# Patient Record
Sex: FEMALE | Race: BLACK OR AFRICAN AMERICAN | Employment: OTHER | URBAN - METROPOLITAN AREA
[De-identification: names, ages, dates, MRNs, and addresses within clinical notes are randomized per-mention and may not be internally consistent; named-entity substitution may affect disease eponyms.]

---

## 2017-02-22 ENCOUNTER — OFFICE VISIT (OUTPATIENT)
Dept: INTERNAL MEDICINE CLINIC | Facility: HOSPITAL | Age: 52
End: 2017-02-22
Attending: EMERGENCY MEDICINE

## 2017-02-22 ENCOUNTER — HOSPITAL ENCOUNTER (OUTPATIENT)
Dept: GENERAL RADIOLOGY | Facility: HOSPITAL | Age: 52
Discharge: HOME | End: 2017-02-22
Attending: EMERGENCY MEDICINE

## 2017-02-22 DIAGNOSIS — Z00.00 WELLNESS EXAMINATION: ICD-10-CM

## 2017-02-22 DIAGNOSIS — Z00.00 WELLNESS EXAMINATION: Primary | ICD-10-CM

## 2017-02-22 LAB — RUBV IGG SER-ACNC: 34.3 IU/ML

## 2017-02-22 PROCEDURE — 86762 RUBELLA ANTIBODY: CPT

## 2017-02-22 PROCEDURE — 86787 VARICELLA-ZOSTER ANTIBODY: CPT

## 2017-02-22 PROCEDURE — 86765 RUBEOLA ANTIBODY: CPT

## 2017-02-22 PROCEDURE — 86735 MUMPS ANTIBODY: CPT

## 2017-02-22 PROCEDURE — 71020 XR CHEST PA + LAT CHEST (CPT=71020): CPT

## 2017-02-24 LAB
MEV IGG SER-ACNC: 300 AU/ML (ref 30–?)
MUV IGG SER IA-ACNC: 214 AU/ML (ref 11–?)
VZV IGG SER IA-ACNC: 1212 (ref 165–?)

## 2017-04-10 ENCOUNTER — TELEPHONE (OUTPATIENT)
Dept: OPHTHALMOLOGY | Facility: CLINIC | Age: 52
End: 2017-04-10

## 2017-04-10 NOTE — TELEPHONE ENCOUNTER
Pt states that she poked her right eye with a cheese bag on Friday. Pt was seen at [place] [address] ER Saturday and was told no corneal abrasion, just a broken blood vessel OD. Pt is concerned because now she has been getting blurred vision OD.  Apt scheduled on We

## 2017-04-10 NOTE — TELEPHONE ENCOUNTER
LM for pt to call back. PLEASE GET PATIENTS INSURANCE INFORMATION AND PCP INFO WHEN SHE CALLS BACK. Thank you.

## 2017-04-10 NOTE — TELEPHONE ENCOUNTER
pt called. Was seen at ED yesterday. She poked her eye. Blood shot red. Looks like a blood vessel is busted. Please advise.

## 2017-04-12 ENCOUNTER — OFFICE VISIT (OUTPATIENT)
Dept: OPHTHALMOLOGY | Facility: CLINIC | Age: 52
End: 2017-04-12

## 2017-04-12 DIAGNOSIS — H11.31 TRAUMATIC SUBCONJUNCTIVAL HEMORRHAGE OF RIGHT EYE: Primary | ICD-10-CM

## 2017-04-12 PROCEDURE — 99203 OFFICE O/P NEW LOW 30 MIN: CPT | Performed by: OPHTHALMOLOGY

## 2017-04-12 PROCEDURE — 99212 OFFICE O/P EST SF 10 MIN: CPT | Performed by: OPHTHALMOLOGY

## 2017-04-12 NOTE — ASSESSMENT & PLAN NOTE
Discussed diagnosis with patient. No treatment is needed at this time. Patient was reassured that there is no scratch, infection, foreign body or inflammation in the eye. Told  patient that this may take up to  1-2 weeks to resolve.    Patient instructed

## 2017-04-12 NOTE — PATIENT INSTRUCTIONS
Traumatic subconjunctival hemorrhage of right eye  Discussed diagnosis with patient. No treatment is needed at this time. Patient was reassured that there is no scratch, infection, foreign body or inflammation in the eye.   Told  patient that this may shaniqua a hemorrhage. It usually happens once and then goes away. But some health conditions may cause repeat hemorrhages. You may feel like you have something in your eye, but this is not common. The hemorrhage shouldn’t affect your eyesight or cause any pain.  If This information is not intended as a substitute for professional medical care. Always follow your healthcare professional's instructions.         Subconjunctival Hemorrhage    A subconjunctival hemorrhage is a result of a broken blood vessel in the white p

## 2017-04-12 NOTE — PROGRESS NOTES
Robert Marshall is a 49year old female. HPI:     HPI     NP. Patient states that she poked her right eye with a empty plastic cheese bag on 4/7/17.  Patient was seen at [place] ER on 4/8/17 and was told no corneal abrasion, just a broken blood vessel in instructed that they can use over the counter artificial tears as needed for ocular comfort while this is resolving. Subconjunctival hemmorhage information  given. No orders of the defined types were placed in this encounter.        Meds This Visi

## 2017-09-08 ENCOUNTER — OFFICE VISIT (OUTPATIENT)
Dept: FAMILY MEDICINE CLINIC | Facility: CLINIC | Age: 52
End: 2017-09-08

## 2017-09-08 VITALS
HEART RATE: 67 BPM | OXYGEN SATURATION: 98 % | TEMPERATURE: 98 F | WEIGHT: 215 LBS | BODY MASS INDEX: 35.82 KG/M2 | SYSTOLIC BLOOD PRESSURE: 112 MMHG | HEIGHT: 65 IN | DIASTOLIC BLOOD PRESSURE: 82 MMHG | RESPIRATION RATE: 16 BRPM

## 2017-09-08 DIAGNOSIS — Z00.00 ROUTINE ADULT HEALTH MAINTENANCE: Primary | ICD-10-CM

## 2017-09-08 DIAGNOSIS — I10 ESSENTIAL HYPERTENSION: ICD-10-CM

## 2017-09-08 DIAGNOSIS — E66.9 OBESITY (BMI 35.0-39.9 WITHOUT COMORBIDITY): ICD-10-CM

## 2017-09-08 PROCEDURE — 87491 CHLMYD TRACH DNA AMP PROBE: CPT | Performed by: FAMILY MEDICINE

## 2017-09-08 PROCEDURE — 99386 PREV VISIT NEW AGE 40-64: CPT | Performed by: FAMILY MEDICINE

## 2017-09-08 PROCEDURE — 87624 HPV HI-RISK TYP POOLED RSLT: CPT | Performed by: FAMILY MEDICINE

## 2017-09-08 PROCEDURE — 88175 CYTOPATH C/V AUTO FLUID REDO: CPT | Performed by: FAMILY MEDICINE

## 2017-09-08 PROCEDURE — 87591 N.GONORRHOEAE DNA AMP PROB: CPT | Performed by: FAMILY MEDICINE

## 2017-09-08 RX ORDER — ENALAPRIL MALEATE AND HYDROCHLOROTHIAZIDE 10; 25 MG/1; MG/1
TABLET ORAL
COMMUNITY
Start: 2017-07-21 | End: 2019-04-29

## 2017-09-08 RX ORDER — POTASSIUM CHLORIDE 750 MG/1
TABLET, FILM COATED, EXTENDED RELEASE ORAL
COMMUNITY
Start: 2017-07-21 | End: 2020-10-13

## 2017-09-08 NOTE — PROGRESS NOTES
Yunier Cruz is a 49year old female who presents for a complete physical exam.     HPI:   Last PAP: 2 years ag  Last Mammogram: over a year ago  Contraception: condoms, sometimes  History of STD's: no  History of intimate partner violence: no  Family hx immunization history on file for this patient.     MEDICAL HISTORY:     Past Medical History:   Diagnosis Date   • Essential hypertension        SOCIAL HISTORY:     Social History  Social History   Marital status:   Spouse name: N/A    Years of educ normal, cervix normal, right adnexa normal and left adnexa normal. No vaginal discharge found. Musculoskeletal: Normal range of motion. Lymphadenopathy:     She has no cervical adenopathy.    Neurological: She is alert and oriented to person, place, and self-exam  · Breast cancer screening/ mammograms and clinical breast exams  · Cervical cancer screening/ pap smears  · Healthy diet including adequate intake of vegetables and fruits, appropriate portion sizes, minimizing highly concentrated carbohydrate f

## 2017-09-10 LAB
C TRACH DNA SPEC QL NAA+PROBE: NEGATIVE
HPV I/H RISK 1 DNA SPEC QL NAA+PROBE: NEGATIVE
N GONORRHOEA DNA SPEC QL NAA+PROBE: NEGATIVE

## 2017-10-06 ENCOUNTER — LAB ENCOUNTER (OUTPATIENT)
Dept: LAB | Facility: REFERENCE LAB | Age: 52
End: 2017-10-06
Attending: FAMILY MEDICINE
Payer: COMMERCIAL

## 2017-10-06 ENCOUNTER — HOSPITAL ENCOUNTER (OUTPATIENT)
Dept: MAMMOGRAPHY | Age: 52
Discharge: HOME | End: 2017-10-06
Attending: FAMILY MEDICINE
Payer: COMMERCIAL

## 2017-10-06 DIAGNOSIS — I10 ESSENTIAL HYPERTENSION: ICD-10-CM

## 2017-10-06 DIAGNOSIS — E66.9 OBESITY (BMI 35.0-39.9 WITHOUT COMORBIDITY): ICD-10-CM

## 2017-10-06 DIAGNOSIS — Z00.00 ROUTINE ADULT HEALTH MAINTENANCE: ICD-10-CM

## 2017-10-06 PROCEDURE — 82570 ASSAY OF URINE CREATININE: CPT | Performed by: FAMILY MEDICINE

## 2017-10-06 PROCEDURE — 86696 HERPES SIMPLEX TYPE 2 TEST: CPT | Performed by: FAMILY MEDICINE

## 2017-10-06 PROCEDURE — 82306 VITAMIN D 25 HYDROXY: CPT | Performed by: FAMILY MEDICINE

## 2017-10-06 PROCEDURE — 77063 BREAST TOMOSYNTHESIS BI: CPT | Performed by: FAMILY MEDICINE

## 2017-10-06 PROCEDURE — 87389 HIV-1 AG W/HIV-1&-2 AB AG IA: CPT | Performed by: FAMILY MEDICINE

## 2017-10-06 PROCEDURE — 82043 UR ALBUMIN QUANTITATIVE: CPT | Performed by: FAMILY MEDICINE

## 2017-10-06 PROCEDURE — 36415 COLL VENOUS BLD VENIPUNCTURE: CPT | Performed by: FAMILY MEDICINE

## 2017-10-06 PROCEDURE — 77067 SCR MAMMO BI INCL CAD: CPT | Performed by: FAMILY MEDICINE

## 2017-10-06 PROCEDURE — 83036 HEMOGLOBIN GLYCOSYLATED A1C: CPT | Performed by: FAMILY MEDICINE

## 2017-10-06 PROCEDURE — 80061 LIPID PANEL: CPT | Performed by: FAMILY MEDICINE

## 2017-10-06 PROCEDURE — 84443 ASSAY THYROID STIM HORMONE: CPT | Performed by: FAMILY MEDICINE

## 2017-10-06 PROCEDURE — 86695 HERPES SIMPLEX TYPE 1 TEST: CPT | Performed by: FAMILY MEDICINE

## 2017-10-06 PROCEDURE — 86780 TREPONEMA PALLIDUM: CPT | Performed by: FAMILY MEDICINE

## 2017-11-02 ENCOUNTER — HOSPITAL ENCOUNTER (OUTPATIENT)
Dept: MAMMOGRAPHY | Facility: HOSPITAL | Age: 52
Discharge: HOME | End: 2017-11-02
Attending: FAMILY MEDICINE
Payer: COMMERCIAL

## 2017-11-02 DIAGNOSIS — R92.8 ABNORMAL MAMMOGRAM: ICD-10-CM

## 2017-11-02 PROCEDURE — 77065 DX MAMMO INCL CAD UNI: CPT | Performed by: FAMILY MEDICINE

## 2017-12-18 ENCOUNTER — TELEPHONE (OUTPATIENT)
Dept: OBGYN CLINIC | Facility: CLINIC | Age: 52
End: 2017-12-18

## 2017-12-18 NOTE — TELEPHONE ENCOUNTER
Patient calling to see if the paperwork, health provider screening is finished and if this can be faxed to the patient. Fax [phone].

## 2017-12-18 NOTE — TELEPHONE ENCOUNTER
Called patient, advised this form has been completed and faxed to fax number listed on form.  Patient wishes to have the form faxed to her so she can have a copy, advised patient that I have sent form to scanning and I do not have access to this as of now,

## 2017-12-28 NOTE — TELEPHONE ENCOUNTER
Work health provider screening form was faxed to [phone] per patient's request. Received confirmation fax sheet.

## 2018-04-26 ENCOUNTER — HOSPITAL ENCOUNTER (EMERGENCY)
Facility: HOSPITAL | Age: 53
Discharge: HOME | End: 2018-04-26
Payer: COMMERCIAL

## 2018-04-26 ENCOUNTER — APPOINTMENT (OUTPATIENT)
Dept: CT IMAGING | Facility: HOSPITAL | Age: 53
End: 2018-04-26
Payer: COMMERCIAL

## 2018-04-26 ENCOUNTER — HOSPITAL ENCOUNTER (OUTPATIENT)
Dept: CT IMAGING | Facility: HOSPITAL | Age: 53
Discharge: HOME | End: 2018-04-26
Attending: FAMILY MEDICINE
Payer: COMMERCIAL

## 2018-04-26 ENCOUNTER — OFFICE VISIT (OUTPATIENT)
Dept: FAMILY MEDICINE CLINIC | Facility: CLINIC | Age: 53
End: 2018-04-26

## 2018-04-26 VITALS
HEIGHT: 65 IN | OXYGEN SATURATION: 98 % | RESPIRATION RATE: 17 BRPM | DIASTOLIC BLOOD PRESSURE: 82 MMHG | WEIGHT: 216 LBS | SYSTOLIC BLOOD PRESSURE: 132 MMHG | BODY MASS INDEX: 35.99 KG/M2 | HEART RATE: 69 BPM

## 2018-04-26 VITALS
SYSTOLIC BLOOD PRESSURE: 144 MMHG | TEMPERATURE: 98 F | HEART RATE: 71 BPM | DIASTOLIC BLOOD PRESSURE: 90 MMHG | RESPIRATION RATE: 19 BRPM | OXYGEN SATURATION: 99 %

## 2018-04-26 DIAGNOSIS — K59.00 CONSTIPATION, UNSPECIFIED CONSTIPATION TYPE: ICD-10-CM

## 2018-04-26 DIAGNOSIS — R10.33 UMBILICAL PAIN: Primary | ICD-10-CM

## 2018-04-26 DIAGNOSIS — L91.0 KELOID: ICD-10-CM

## 2018-04-26 DIAGNOSIS — Q64.4 URACHAL CYST: Primary | ICD-10-CM

## 2018-04-26 DIAGNOSIS — M54.12 CERVICAL RADICULOPATHY: ICD-10-CM

## 2018-04-26 DIAGNOSIS — R10.33 UMBILICAL PAIN: ICD-10-CM

## 2018-04-26 PROCEDURE — 96361 HYDRATE IV INFUSION ADD-ON: CPT

## 2018-04-26 PROCEDURE — 80048 BASIC METABOLIC PNL TOTAL CA: CPT | Performed by: EMERGENCY MEDICINE

## 2018-04-26 PROCEDURE — 99214 OFFICE O/P EST MOD 30 MIN: CPT | Performed by: FAMILY MEDICINE

## 2018-04-26 PROCEDURE — 82565 ASSAY OF CREATININE: CPT

## 2018-04-26 PROCEDURE — 99284 EMERGENCY DEPT VISIT MOD MDM: CPT

## 2018-04-26 PROCEDURE — 85025 COMPLETE CBC W/AUTO DIFF WBC: CPT | Performed by: EMERGENCY MEDICINE

## 2018-04-26 PROCEDURE — 74177 CT ABD & PELVIS W/CONTRAST: CPT

## 2018-04-26 PROCEDURE — 80076 HEPATIC FUNCTION PANEL: CPT | Performed by: EMERGENCY MEDICINE

## 2018-04-26 PROCEDURE — 96374 THER/PROPH/DIAG INJ IV PUSH: CPT

## 2018-04-26 PROCEDURE — 96375 TX/PRO/DX INJ NEW DRUG ADDON: CPT

## 2018-04-26 PROCEDURE — 83690 ASSAY OF LIPASE: CPT | Performed by: EMERGENCY MEDICINE

## 2018-04-26 RX ORDER — DOCUSATE SODIUM 100 MG/1
100 CAPSULE, LIQUID FILLED ORAL 2 TIMES DAILY
Qty: 60 CAPSULE | Refills: 0 | Status: SHIPPED | OUTPATIENT
Start: 2018-04-26 | End: 2018-04-26

## 2018-04-26 RX ORDER — ONDANSETRON 2 MG/ML
4 INJECTION INTRAMUSCULAR; INTRAVENOUS ONCE
Status: COMPLETED | OUTPATIENT
Start: 2018-04-26 | End: 2018-04-26

## 2018-04-26 RX ORDER — HYDROCODONE BITARTRATE AND ACETAMINOPHEN 5; 325 MG/1; MG/1
1-2 TABLET ORAL EVERY 4 HOURS PRN
Qty: 15 TABLET | Refills: 0 | Status: SHIPPED | OUTPATIENT
Start: 2018-04-26 | End: 2018-05-03

## 2018-04-26 RX ORDER — CEPHALEXIN 500 MG/1
500 CAPSULE ORAL 4 TIMES DAILY
Qty: 28 CAPSULE | Refills: 0 | Status: SHIPPED | OUTPATIENT
Start: 2018-04-26 | End: 2018-05-03

## 2018-04-26 RX ORDER — DOCUSATE SODIUM 100 MG/1
100 CAPSULE, LIQUID FILLED ORAL 2 TIMES DAILY
Qty: 60 CAPSULE | Refills: 0 | Status: SHIPPED | OUTPATIENT
Start: 2018-04-26 | End: 2018-05-26

## 2018-04-26 RX ORDER — HYDROCODONE BITARTRATE AND ACETAMINOPHEN 5; 325 MG/1; MG/1
1-2 TABLET ORAL EVERY 4 HOURS PRN
Qty: 15 TABLET | Refills: 0 | Status: SHIPPED | OUTPATIENT
Start: 2018-04-26 | End: 2018-04-26

## 2018-04-26 RX ORDER — MORPHINE SULFATE 4 MG/ML
4 INJECTION, SOLUTION INTRAMUSCULAR; INTRAVENOUS ONCE
Status: COMPLETED | OUTPATIENT
Start: 2018-04-26 | End: 2018-04-26

## 2018-04-27 NOTE — ED PROVIDER NOTES
Patient Seen in: [facility] AND [facility] Emergency Department    History   Patient presents with:  Abdomen/Flank Pain (GI/)    Stated Complaint: abd pain     HPI    44-year-old female with history of hypertension here with complaints of periumbilical abdomin light-headedness and headaches. All other systems reviewed and are negative. Positive for stated complaint: abd pain   Other systems are as noted in HPI. Constitutional and vital signs reviewed.       All other systems reviewed and negative except a oxygenation.     PROCEDURES:  none    DIAGNOSTICS:   Labs:    Recent Results (from the past 24 hour(s))  -POCT CREATININE   Collection Time: 04/26/18  7:29 PM   Result Value Ref Range   ISTAT Creatinine 1.0 0.5 - 1.5 mg/dL   GFR, Non-African American >60 >= Abdomen+pelvis(contrast Only)(cpt=74177)    Result Date: 4/26/2018  CONCLUSION:  1. Hepatomegaly versus Riedel's lobe variant. No visualized focal hepatic lesions. 2. Negative gallbladder. No biliary ductal dilatation.  Unremarkable spleen is pancreas, adre with surgery  - discussed with Dr. Abdullahi Muniz - requesting followup with Dr. Cortes Silva and cover with antibiotics    The patient was informed of their elevated blood pressure reading in the Emergency Department.   They were informed of the dangers of undiagnosed List    START taking these medications    HYDROcodone-acetaminophen 5-325 MG Oral Tab  Take 1-2 tablets by mouth every 4 (four) hours as needed.   Qty: 15 tablet Refills: 0    docusate sodium 100 MG Oral Cap  Take 1 capsule (100 mg total) by mouth 2 (two) t

## 2018-04-27 NOTE — ED INITIAL ASSESSMENT (HPI)
Pt here with abd pain, was having outpt ct but is crying in pain. Did drink the contrast and is ready for ct. No nausea. No diarrhea.  No dark stools

## 2018-04-30 NOTE — PROGRESS NOTES
Abhishek Meléndez is a 47year old female.     Patient presents with:  Blood In Stool: started a couple weeks ago as if she had her period, blood in stool came back last week, no longer has blood in stool (coming and going)  Swelling: stomach/naval, had surger cephALEXin (KEFLEX) 500 MG Oral Cap Take 1 capsule (500 mg total) by mouth 4 (four) times daily.  Disp: 28 capsule Rfl: 0       SOCIAL HISTORY:     Social History  Social History   Marital status:   Spouse name: N/A    Years of education: N/A  Num Future    2. Keloid  - CT ABDOMEN (W+WO) (CPT=74170); Future    3. Cervical radiculopathy  - PHYSICAL THERAPY - INTERNAL    Pain at umbilicus significant and sent for stat CT of abdomen. R/o strangulated hernia vs abdominal abscess.  Will give further instr

## 2018-05-01 PROBLEM — R19.09 UMBILICAL MASS: Status: ACTIVE | Noted: 2018-05-01

## 2018-05-01 PROBLEM — L91.0 KELOID: Status: ACTIVE | Noted: 2018-05-01

## 2018-05-02 ENCOUNTER — PATIENT MESSAGE (OUTPATIENT)
Dept: FAMILY MEDICINE CLINIC | Facility: CLINIC | Age: 53
End: 2018-05-02

## 2018-05-02 DIAGNOSIS — K92.1 BLOOD IN STOOL: Primary | ICD-10-CM

## 2018-05-02 DIAGNOSIS — Z12.11 COLON CANCER SCREENING: ICD-10-CM

## 2018-05-02 NOTE — TELEPHONE ENCOUNTER
From: Zamzam Mejia  To: Wendy Gaspar DO  Sent: 5/2/2018 8:57 AM CDT  Subject: Non-Urgent Medical Question    Good morning Dr Konrad Pace, I am scheduled for outpatient surgery on Friday 5/4/2018. I will be off work for the week of 5/7/2018.  I would like to

## 2018-05-04 ENCOUNTER — LAB REQUISITION (OUTPATIENT)
Dept: LAB | Facility: HOSPITAL | Age: 53
End: 2018-05-04
Payer: COMMERCIAL

## 2018-05-04 DIAGNOSIS — Z01.89 ENCOUNTER FOR OTHER SPECIFIED SPECIAL EXAMINATIONS: ICD-10-CM

## 2018-05-04 PROCEDURE — 88300 SURGICAL PATH GROSS: CPT | Performed by: SURGERY

## 2018-05-04 PROCEDURE — 88305 TISSUE EXAM BY PATHOLOGIST: CPT | Performed by: SURGERY

## 2018-05-24 ENCOUNTER — TELEPHONE (OUTPATIENT)
Dept: GASTROENTEROLOGY | Facility: CLINIC | Age: 53
End: 2018-05-24

## 2018-05-24 ENCOUNTER — OFFICE VISIT (OUTPATIENT)
Dept: GASTROENTEROLOGY | Facility: CLINIC | Age: 53
End: 2018-05-24

## 2018-05-24 VITALS
HEART RATE: 68 BPM | SYSTOLIC BLOOD PRESSURE: 124 MMHG | DIASTOLIC BLOOD PRESSURE: 84 MMHG | HEIGHT: 65 IN | BODY MASS INDEX: 36.26 KG/M2 | WEIGHT: 217.63 LBS

## 2018-05-24 DIAGNOSIS — K62.5 RECTAL BLEEDING: ICD-10-CM

## 2018-05-24 DIAGNOSIS — Z12.11 ENCOUNTER FOR SCREENING COLONOSCOPY: Primary | ICD-10-CM

## 2018-05-24 PROCEDURE — 99212 OFFICE O/P EST SF 10 MIN: CPT | Performed by: PHYSICIAN ASSISTANT

## 2018-05-24 PROCEDURE — 99244 OFF/OP CNSLTJ NEW/EST MOD 40: CPT | Performed by: PHYSICIAN ASSISTANT

## 2018-05-24 RX ORDER — POLYETHYLENE GLYCOL 3350, SODIUM CHLORIDE, SODIUM BICARBONATE, POTASSIUM CHLORIDE 420; 11.2; 5.72; 1.48 G/4L; G/4L; G/4L; G/4L
POWDER, FOR SOLUTION ORAL
Qty: 1 BOTTLE | Refills: 0 | Status: SHIPPED | OUTPATIENT
Start: 2018-05-24 | End: 2018-12-03

## 2018-05-24 NOTE — TELEPHONE ENCOUNTER
Yes. That is fine.      Thank you    Elliot Blancas MD  [facility], [facility] - Gastroenterology  5/24/2018  11:24 AM

## 2018-05-24 NOTE — H&P
[address] [facility] [address] Gastroenterology                                                                                                  Clinic History and Physical     Pa  @ [employer]  - Lives with brother     History, Medications, Allergies, ROS:      Past Medical History:   Diagnosis Date   • Essential hypertension       Past Surgical History:  02/2014: COY NEEDLE LOCALIZATION W/ SPECIMEN 1 SITE LEFT      Com lb 9.6 oz (98.7 kg), last menstrual period 05/04/2018.     Gen: patient appears comfortable and in no acute discomfort  HEENT: conjunctiva pink, the sclera appears anicteric, OP clear, MMM  CV: regular rate and rhythm  Lung: moves air well; no labored breat advised    Colonoscopy consent: I have discussed the risks, benefits, and alternatives to colonoscopy with the patient [who demonstrated understanding], including but not limited to the risks of bleeding, infection, pain, death, as well as the risks of ane

## 2018-05-24 NOTE — TELEPHONE ENCOUNTER
GI RN's - pt is requesting a letter for work for the day of her colonoscopy on 6/13/18 with Dr. Cathleen Johnson. Please advise. Thank you.

## 2018-05-24 NOTE — TELEPHONE ENCOUNTER
Pt contacted and informed her letter has been sent via [employer] and she requested it be faxed also to [phone]. Letter faxed as well. Fax confirmation received 5/24/18 @ 3895.

## 2018-05-24 NOTE — TELEPHONE ENCOUNTER
Amanda - please place order for pt's Trilyte. Pharmacy is [facility] in [facility]. Thank you.

## 2018-05-24 NOTE — PATIENT INSTRUCTIONS
1. Schedule colonoscopy with MD provider @ [address] [place] [address] with MAC anesthesia   - Preferably before the patient leaves for the [facility] in early June     2.  bowel prep from pharmacy - I have prescribed Trilyte split dose preparation.     3. Continue all medicat

## 2018-05-24 NOTE — TELEPHONE ENCOUNTER
Scheduled for:  Colonoscopy - [address]  Provider Name:  Dr. Coley Honor  Date:  6/13/18  Location:  [facility]  Sedation:  MAC  Time:  (pt is aware that [facility] will call with arrival time)  Prep:  Trilyte, Prep instructions were given to pt in the office, pt verbalized u

## 2018-06-01 ENCOUNTER — TELEPHONE (OUTPATIENT)
Dept: GASTROENTEROLOGY | Facility: CLINIC | Age: 53
End: 2018-06-01

## 2018-06-07 NOTE — TELEPHONE ENCOUNTER
Rescheduled for:  Colonoscopy - [address] [facility]  Provider Name:  Dr. Dorota Haney  Date:     From-6/13/18  To-6/20/18  Location:  [facility]  Sedation:  MAC  Time:  1000 (pt is aware that [facility] will call with arrival time)  Prep:  Trilyte, new prep instructions sent via [employer]

## 2018-06-20 ENCOUNTER — LAB REQUISITION (OUTPATIENT)
Dept: LAB | Facility: HOSPITAL | Age: 53
End: 2018-06-20
Payer: COMMERCIAL

## 2018-06-20 DIAGNOSIS — Z01.89 ENCOUNTER FOR OTHER SPECIFIED SPECIAL EXAMINATIONS: ICD-10-CM

## 2018-06-20 PROCEDURE — 88305 TISSUE EXAM BY PATHOLOGIST: CPT | Performed by: INTERNAL MEDICINE

## 2018-06-22 ENCOUNTER — TELEPHONE (OUTPATIENT)
Dept: GASTROENTEROLOGY | Facility: CLINIC | Age: 53
End: 2018-06-22

## 2018-06-22 NOTE — TELEPHONE ENCOUNTER
Message   Received:  Today   Message Contents   Antony Loaiza MD  [facility] Clinical Staff             GI staff: please place recall for colonoscopy in 5 years

## 2018-07-25 ENCOUNTER — TELEPHONE (OUTPATIENT)
Dept: FAMILY MEDICINE CLINIC | Facility: CLINIC | Age: 53
End: 2018-07-25

## 2018-07-30 NOTE — TELEPHONE ENCOUNTER
Called pt back and left message  Informing pt of the below.  5000 units of vit d per dr Jimbo Calles

## 2018-07-31 ENCOUNTER — TELEPHONE (OUTPATIENT)
Dept: FAMILY MEDICINE CLINIC | Facility: CLINIC | Age: 53
End: 2018-07-31

## 2018-07-31 NOTE — TELEPHONE ENCOUNTER
Pt calling back re: Vit D. Pt would like to have a script sent in for Vit D to her mail order [facility] so she will automatically get refills sent to her and she will remember to take it. Per Dr. Wilver Madrigal, her ins co will not likely cover this d/t dosing.

## 2018-08-16 ENCOUNTER — OFFICE VISIT (OUTPATIENT)
Dept: FAMILY MEDICINE CLINIC | Facility: CLINIC | Age: 53
End: 2018-08-16
Payer: COMMERCIAL

## 2018-08-16 VITALS
WEIGHT: 220 LBS | DIASTOLIC BLOOD PRESSURE: 98 MMHG | BODY MASS INDEX: 37 KG/M2 | HEART RATE: 76 BPM | OXYGEN SATURATION: 99 % | SYSTOLIC BLOOD PRESSURE: 136 MMHG

## 2018-08-16 DIAGNOSIS — M54.2 NECK PAIN: ICD-10-CM

## 2018-08-16 DIAGNOSIS — G56.32 RADIAL TUNNEL SYNDROME OF LEFT UPPER EXTREMITY: Primary | ICD-10-CM

## 2018-08-16 DIAGNOSIS — M54.9 UPPER BACK PAIN: ICD-10-CM

## 2018-08-16 DIAGNOSIS — R03.0 PREHYPERTENSION: ICD-10-CM

## 2018-08-16 DIAGNOSIS — M62.838 MUSCLE SPASM: ICD-10-CM

## 2018-08-16 PROCEDURE — 99214 OFFICE O/P EST MOD 30 MIN: CPT | Performed by: FAMILY MEDICINE

## 2018-08-16 RX ORDER — BACLOFEN 10 MG/1
10 TABLET ORAL 3 TIMES DAILY PRN
Qty: 60 TABLET | Refills: 0 | Status: SHIPPED | OUTPATIENT
Start: 2018-08-16 | End: 2018-12-03

## 2018-08-16 RX ORDER — BACLOFEN 10 MG/1
10 TABLET ORAL 3 TIMES DAILY PRN
Qty: 60 TABLET | Refills: 0 | Status: SHIPPED | OUTPATIENT
Start: 2018-08-16 | End: 2018-08-16

## 2018-08-16 NOTE — PROGRESS NOTES
Marthenia Collet is a 47year old female. Patient presents with:  Pain: left shoulder      HPI:     Having more numbness and tingling into the Lt arm  Having numbness from the left elbow down into her fingers.   Has gotten worse since she first mentioned i Topics   Smoking status: Current Some Day Smoker     Types: Cigarettes    Smokeless tobacco: Never Used    Comment: 4 cigarettes a day    Alcohol use No    Comment: occasionally    Drug use: Yes     Other Topics Concern   None on file     Social History Na Given further recommendations as below    Orders Placed This Visit:  No orders of the defined types were placed in this encounter.       Patient Instructions   The products and items listed below (the “Products”)  and their claims have not been evaluate

## 2018-08-21 ENCOUNTER — TELEPHONE (OUTPATIENT)
Dept: FAMILY MEDICINE CLINIC | Facility: CLINIC | Age: 53
End: 2018-08-21

## 2018-09-04 ENCOUNTER — OFFICE VISIT (OUTPATIENT)
Dept: PHYSICAL THERAPY | Facility: HOSPITAL | Age: 53
End: 2018-09-04
Attending: FAMILY MEDICINE
Payer: COMMERCIAL

## 2018-09-04 DIAGNOSIS — M54.2 NECK PAIN: ICD-10-CM

## 2018-09-04 DIAGNOSIS — M54.9 UPPER BACK PAIN: ICD-10-CM

## 2018-09-04 PROCEDURE — 97110 THERAPEUTIC EXERCISES: CPT

## 2018-09-04 PROCEDURE — 97161 PT EVAL LOW COMPLEX 20 MIN: CPT

## 2018-09-04 NOTE — PROGRESS NOTES
CERVICAL SPINE EVALUATION:   Referring Physician: Glo Darling DO    Date of Onset: April 2018  Date of Service: 9/4/2018   Diagnosis: Upper back pain (M54.9)  Neck pain (M54.2)   SUBJECTIVE:   PATIENT SUMMARY:  So Ruiz is a 47year old y/o R anterior/posterior cervical  Muscles,  Weakness in the middle and lower trapezius muscles, decreased cervical AROM, and elevated first rib on the L.   Pt would benefit from skilled Physical Therapy to reduce symptoms and to restore ROM, flexibility, and str in sitting, ergonomic work station set up.    Patient was instructed in and issued HEP for: Repeated Cerv retraction 10x to be done 3x/day;    Charges: PT Eval, x1;  TE x1;      Total Timed Treatment: 15 min     Total Treatment Time: 45 min       PLAN OF CA under my care.     X______________________________________________ Date________________  Certification From: 0/9/5026      To: 12/3/2018

## 2018-09-06 ENCOUNTER — APPOINTMENT (OUTPATIENT)
Dept: PHYSICAL THERAPY | Facility: HOSPITAL | Age: 53
End: 2018-09-06
Attending: FAMILY MEDICINE
Payer: COMMERCIAL

## 2018-09-10 ENCOUNTER — APPOINTMENT (OUTPATIENT)
Dept: PHYSICAL THERAPY | Facility: HOSPITAL | Age: 53
End: 2018-09-10
Attending: FAMILY MEDICINE
Payer: COMMERCIAL

## 2018-09-13 ENCOUNTER — APPOINTMENT (OUTPATIENT)
Dept: PHYSICAL THERAPY | Facility: HOSPITAL | Age: 53
End: 2018-09-13
Attending: FAMILY MEDICINE
Payer: COMMERCIAL

## 2018-09-17 ENCOUNTER — APPOINTMENT (OUTPATIENT)
Dept: PHYSICAL THERAPY | Facility: HOSPITAL | Age: 53
End: 2018-09-17
Attending: FAMILY MEDICINE
Payer: COMMERCIAL

## 2018-09-18 ENCOUNTER — TELEPHONE (OUTPATIENT)
Dept: OCCUPATIONAL MEDICINE | Facility: HOSPITAL | Age: 53
End: 2018-09-18

## 2018-09-19 ENCOUNTER — APPOINTMENT (OUTPATIENT)
Dept: PHYSICAL THERAPY | Facility: HOSPITAL | Age: 53
End: 2018-09-19
Attending: FAMILY MEDICINE
Payer: COMMERCIAL

## 2018-09-24 ENCOUNTER — APPOINTMENT (OUTPATIENT)
Dept: PHYSICAL THERAPY | Facility: HOSPITAL | Age: 53
End: 2018-09-24
Attending: FAMILY MEDICINE
Payer: COMMERCIAL

## 2018-09-26 ENCOUNTER — APPOINTMENT (OUTPATIENT)
Dept: PHYSICAL THERAPY | Facility: HOSPITAL | Age: 53
End: 2018-09-26
Attending: FAMILY MEDICINE
Payer: COMMERCIAL

## 2018-09-27 ENCOUNTER — APPOINTMENT (OUTPATIENT)
Dept: OCCUPATIONAL MEDICINE | Facility: HOSPITAL | Age: 53
End: 2018-09-27
Attending: FAMILY MEDICINE
Payer: COMMERCIAL

## 2018-10-01 ENCOUNTER — APPOINTMENT (OUTPATIENT)
Dept: PHYSICAL THERAPY | Facility: HOSPITAL | Age: 53
End: 2018-10-01
Attending: FAMILY MEDICINE
Payer: COMMERCIAL

## 2018-10-02 ENCOUNTER — APPOINTMENT (OUTPATIENT)
Dept: OCCUPATIONAL MEDICINE | Facility: HOSPITAL | Age: 53
End: 2018-10-02
Attending: FAMILY MEDICINE
Payer: COMMERCIAL

## 2018-10-03 ENCOUNTER — APPOINTMENT (OUTPATIENT)
Dept: PHYSICAL THERAPY | Facility: HOSPITAL | Age: 53
End: 2018-10-03
Attending: FAMILY MEDICINE
Payer: COMMERCIAL

## 2018-10-04 ENCOUNTER — APPOINTMENT (OUTPATIENT)
Dept: OCCUPATIONAL MEDICINE | Facility: HOSPITAL | Age: 53
End: 2018-10-04
Attending: FAMILY MEDICINE
Payer: COMMERCIAL

## 2018-10-09 ENCOUNTER — APPOINTMENT (OUTPATIENT)
Dept: OCCUPATIONAL MEDICINE | Facility: HOSPITAL | Age: 53
End: 2018-10-09
Attending: FAMILY MEDICINE
Payer: COMMERCIAL

## 2018-10-11 PROBLEM — F33.0 MILD EPISODE OF RECURRENT MAJOR DEPRESSIVE DISORDER (HCC): Status: ACTIVE | Noted: 2018-10-11

## 2018-10-11 PROBLEM — E66.9 OBESITY (BMI 30-39.9): Status: ACTIVE | Noted: 2017-09-08

## 2018-10-11 NOTE — PROGRESS NOTES
Honey Keenan is a 47year old female. Patient presents with:   Follow - Up: weight, cycle,       HPI:     Pain went away using the baclofen  Feeling very emotional -  Emotions are up and down  Son is now incarcerated for the past 6 yrs  Sister is in nu Spouse name: Not on file      Number of children: Not on file      Years of education: Not on file      Highest education level: Not on file    Social Needs      Financial resource strain: Not on file      Food insecurity - worry: Not on file      Food 1. Mild episode of recurrent major depressive disorder ([facility] [address]  -  NAVIGATOR    2. Obesity (BMI 30-39.9)  - MEDICAL NUTRITIONAL THERAPY ([place]) - INTERNAL REFERRAL    3. Chronic fatigue    4.  Essential hypertension    HTN - stable, cpm  Referred to [facility] n – ½ tsp Coriander Seeds  – ½ tsp Fennel Seeds     • Boil 4 cups of water  • Add each seed to the hot water and let steep for 10 min  • Strain out the seeds and pour the liquid into a thermos  • Sip the warm liquid throughout the day  • Start fresh with a n Cheese  Milk  Cream  Whey isolate  MOLDY NUTS & SEEDS  Peanuts  Cashews  Pistachios  CONDIMENTS  Barbecue sauce  Horseradish  Ketchup  Mayonnaise  Soy sauce  White vinegar  REFINED/PROCESSED FATS & OILS  Canola oil  Fake ‘butter’ spreads  Margarine  Sweden

## 2018-10-11 NOTE — PATIENT INSTRUCTIONS
The products and items listed below (the “Products”)  and their claims have not been evaluated by the Food and Drug Administration. Dietary products are not intended to treat, prevent, mitigate or cure disease.  Ultimately, you must draw your own conclusion Cucumber  Eggplant  Garlic (raw)  Kale  Onions  [place]  Spinach  Tomatoes  Zucchini  LOW SUGAR FRUITS  Avocado  Lemon  Lime  Olives  NON-GLUTINOUS GRAINS  Buckwheat  Millet  Oat bran  Quinoa  Teff  HEALTHY PROTEINS  Anchovies  Chicken  Eggs  Herring  Sal DRINKS  Chicory coffee  Filtered water  Herbal teas  ALCOHOLIC DRINKS  Beer  Cider  Liquors  Spirits  Wine       Snack ideas  ? Flax or seed crackers ([employer] Crackers)  ? Jerky (bison, grass-fed beef or turkey—try [employer] or [employer] Jerky Chews  ?  Sal

## 2018-10-16 ENCOUNTER — APPOINTMENT (OUTPATIENT)
Dept: OCCUPATIONAL MEDICINE | Facility: HOSPITAL | Age: 53
End: 2018-10-16
Attending: FAMILY MEDICINE
Payer: COMMERCIAL

## 2018-10-18 ENCOUNTER — APPOINTMENT (OUTPATIENT)
Dept: OCCUPATIONAL MEDICINE | Facility: HOSPITAL | Age: 53
End: 2018-10-18
Attending: FAMILY MEDICINE
Payer: COMMERCIAL

## 2018-10-18 ENCOUNTER — TELEPHONE (OUTPATIENT)
Dept: FAMILY MEDICINE CLINIC | Facility: CLINIC | Age: 53
End: 2018-10-18

## 2018-10-18 DIAGNOSIS — Z12.39 SCREENING BREAST EXAMINATION: Primary | ICD-10-CM

## 2018-10-18 NOTE — TELEPHONE ENCOUNTER
Per Dr. Michelle Deutsch ok to order. Mammo Yannick preferred. Ordered and pt notified.     Pt has appmnt scheduled with Dr. Michelle Deutsch on 12/3/18

## 2018-10-19 ENCOUNTER — TELEPHONE (OUTPATIENT)
Dept: FAMILY MEDICINE CLINIC | Facility: CLINIC | Age: 53
End: 2018-10-19

## 2018-10-19 NOTE — TELEPHONE ENCOUNTER
Spoke to pt and gave her Dr. Seble Borden recommendations. Pt voiced understanding and she will let us know how she is feeling on Monday.

## 2018-10-19 NOTE — TELEPHONE ENCOUNTER
Patient requesting an call back in regard to having Migraines states thinks its from medication that was prescribed patient unsure of medication name

## 2018-10-23 ENCOUNTER — APPOINTMENT (OUTPATIENT)
Dept: OCCUPATIONAL MEDICINE | Facility: HOSPITAL | Age: 53
End: 2018-10-23
Attending: FAMILY MEDICINE
Payer: COMMERCIAL

## 2018-12-03 ENCOUNTER — OFFICE VISIT (OUTPATIENT)
Dept: FAMILY MEDICINE CLINIC | Facility: CLINIC | Age: 53
End: 2018-12-03
Payer: COMMERCIAL

## 2018-12-03 VITALS
HEART RATE: 84 BPM | OXYGEN SATURATION: 97 % | BODY MASS INDEX: 36.49 KG/M2 | HEIGHT: 65 IN | DIASTOLIC BLOOD PRESSURE: 72 MMHG | SYSTOLIC BLOOD PRESSURE: 120 MMHG | WEIGHT: 219 LBS

## 2018-12-03 DIAGNOSIS — R73.03 PREDIABETES: ICD-10-CM

## 2018-12-03 DIAGNOSIS — Z00.00 ROUTINE MEDICAL EXAM: Primary | ICD-10-CM

## 2018-12-03 DIAGNOSIS — I10 ESSENTIAL HYPERTENSION: ICD-10-CM

## 2018-12-03 DIAGNOSIS — E66.9 OBESITY (BMI 30-39.9): ICD-10-CM

## 2018-12-03 PROCEDURE — 99396 PREV VISIT EST AGE 40-64: CPT | Performed by: FAMILY MEDICINE

## 2018-12-03 RX ORDER — ESCITALOPRAM OXALATE 5 MG/1
5 TABLET ORAL DAILY
Qty: 90 TABLET | Refills: 0 | Status: SHIPPED | OUTPATIENT
Start: 2018-12-03 | End: 2019-04-23

## 2018-12-03 NOTE — PROGRESS NOTES
Clementina Cárdenas is a 47year old female. Patient presents with:  Physical: no pap no breast exam       HPI:     Started the lexapro, was getting a HA from 10mg and broke it in half and feeling good with that.  Mood is much better, no longer crying all the MEDICAL HISTORY:     Past Medical History:   Diagnosis Date   • Colon adenoma 06/20/2018    repeat colonoscopy 6/2023   • Essential hypertension        CURRENT MEDICATIONS:     Current Outpatient Medications:  escitalopram 5 MG Oral Tab Take 1 tablet 97%   Weight: 219 lb   Height: 65\"       Physical Exam   Constitutional: She is oriented to person, place, and time and well-developed, well-nourished, and in no distress. No distress. HENT:   Head: Normocephalic and atraumatic.    Right Ear: External ea Hemoglobin A1C (Glycohemoglobin) [E]      Vitamin D, 25-Hydroxy [E]      TSH W Reflex To Free T4 [E]      Microalb/Creat Ratio, Random Urine [E]      There are no Patient Instructions on file for this visit.     Return in about 6 months (around 6/3/2019) fo

## 2018-12-04 ENCOUNTER — NURSE ONLY (OUTPATIENT)
Dept: HEMATOLOGY/ONCOLOGY | Facility: HOSPITAL | Age: 53
End: 2018-12-04
Attending: FAMILY MEDICINE
Payer: COMMERCIAL

## 2018-12-04 DIAGNOSIS — Z00.00 ROUTINE MEDICAL EXAM: ICD-10-CM

## 2018-12-04 PROCEDURE — 80061 LIPID PANEL: CPT

## 2018-12-04 PROCEDURE — 82728 ASSAY OF FERRITIN: CPT

## 2018-12-04 PROCEDURE — 84443 ASSAY THYROID STIM HORMONE: CPT

## 2018-12-04 PROCEDURE — 36415 COLL VENOUS BLD VENIPUNCTURE: CPT

## 2018-12-04 PROCEDURE — 80053 COMPREHEN METABOLIC PANEL: CPT

## 2018-12-04 PROCEDURE — 82306 VITAMIN D 25 HYDROXY: CPT

## 2018-12-04 PROCEDURE — 85025 COMPLETE CBC W/AUTO DIFF WBC: CPT

## 2019-02-25 ENCOUNTER — HOSPITAL ENCOUNTER (OUTPATIENT)
Dept: MAMMOGRAPHY | Facility: HOSPITAL | Age: 54
Discharge: HOME | End: 2019-02-25
Attending: FAMILY MEDICINE
Payer: COMMERCIAL

## 2019-02-25 DIAGNOSIS — Z12.39 SCREENING BREAST EXAMINATION: ICD-10-CM

## 2019-02-25 PROCEDURE — 77067 SCR MAMMO BI INCL CAD: CPT | Performed by: FAMILY MEDICINE

## 2019-02-25 PROCEDURE — 77063 BREAST TOMOSYNTHESIS BI: CPT | Performed by: FAMILY MEDICINE

## 2019-03-07 ENCOUNTER — PATIENT MESSAGE (OUTPATIENT)
Dept: FAMILY MEDICINE CLINIC | Facility: CLINIC | Age: 54
End: 2019-03-07

## 2019-03-11 ENCOUNTER — HOSPITAL ENCOUNTER (OUTPATIENT)
Dept: MAMMOGRAPHY | Facility: HOSPITAL | Age: 54
Discharge: HOME | End: 2019-03-11
Attending: FAMILY MEDICINE
Payer: COMMERCIAL

## 2019-03-11 ENCOUNTER — HOSPITAL ENCOUNTER (OUTPATIENT)
Dept: ULTRASOUND IMAGING | Facility: HOSPITAL | Age: 54
Discharge: HOME | End: 2019-03-11
Attending: FAMILY MEDICINE
Payer: COMMERCIAL

## 2019-03-11 DIAGNOSIS — R92.2 INCONCLUSIVE MAMMOGRAM: ICD-10-CM

## 2019-03-11 DIAGNOSIS — R92.8 ABNORMAL MAMMOGRAM: ICD-10-CM

## 2019-03-11 PROCEDURE — 76642 ULTRASOUND BREAST LIMITED: CPT | Performed by: FAMILY MEDICINE

## 2019-03-11 PROCEDURE — 77065 DX MAMMO INCL CAD UNI: CPT | Performed by: FAMILY MEDICINE

## 2019-03-11 PROCEDURE — 77061 BREAST TOMOSYNTHESIS UNI: CPT | Performed by: FAMILY MEDICINE

## 2019-03-12 ENCOUNTER — HOSPITAL ENCOUNTER (OUTPATIENT)
Dept: ULTRASOUND IMAGING | Facility: HOSPITAL | Age: 54
Discharge: HOME | End: 2019-03-12
Attending: FAMILY MEDICINE
Payer: COMMERCIAL

## 2019-03-12 DIAGNOSIS — R92.2 INCONCLUSIVE MAMMOGRAM: ICD-10-CM

## 2019-03-13 ENCOUNTER — TELEPHONE (OUTPATIENT)
Dept: FAMILY MEDICINE CLINIC | Facility: CLINIC | Age: 54
End: 2019-03-13

## 2019-03-13 NOTE — TELEPHONE ENCOUNTER
----- Message from Sanders Aase, DO sent at 3/13/2019  8:17 AM CDT -----  Hi Zamzam,   The further images are normal, there is no significant abnormality found. Please continue your routine screening exams. Call our office with any questions or concerns.

## 2019-04-23 RX ORDER — ESCITALOPRAM OXALATE 5 MG/1
5 TABLET ORAL DAILY
Qty: 90 TABLET | Refills: 0 | Status: SHIPPED | OUTPATIENT
Start: 2019-04-23 | End: 2019-09-12

## 2019-04-23 NOTE — TELEPHONE ENCOUNTER
Pharmacy has been trying to refill since 4/13/19 and has been sending to old fax number.   Need refill of  escitalopram 5 MG Oral Tab

## 2019-04-24 NOTE — TELEPHONE ENCOUNTER
LM for pt to contact the office to schedule a f/u appt. Escitalopram was refilled yesterday by Maya Suarez.

## 2019-04-29 ENCOUNTER — OFFICE VISIT (OUTPATIENT)
Dept: FAMILY MEDICINE CLINIC | Facility: CLINIC | Age: 54
End: 2019-04-29
Payer: COMMERCIAL

## 2019-04-29 VITALS
SYSTOLIC BLOOD PRESSURE: 132 MMHG | WEIGHT: 223.19 LBS | HEART RATE: 76 BPM | TEMPERATURE: 99 F | DIASTOLIC BLOOD PRESSURE: 78 MMHG | HEIGHT: 65 IN | OXYGEN SATURATION: 99 % | BODY MASS INDEX: 37.19 KG/M2

## 2019-04-29 DIAGNOSIS — I10 ESSENTIAL HYPERTENSION: Primary | ICD-10-CM

## 2019-04-29 DIAGNOSIS — E66.9 OBESITY (BMI 30-39.9): ICD-10-CM

## 2019-04-29 DIAGNOSIS — F33.0 MILD EPISODE OF RECURRENT MAJOR DEPRESSIVE DISORDER (HCC): ICD-10-CM

## 2019-04-29 PROBLEM — Z00.00 ROUTINE ADULT HEALTH MAINTENANCE: Status: RESOLVED | Noted: 2017-09-08 | Resolved: 2019-04-29

## 2019-04-29 PROCEDURE — 99214 OFFICE O/P EST MOD 30 MIN: CPT | Performed by: PHYSICIAN ASSISTANT

## 2019-04-29 RX ORDER — ENALAPRIL MALEATE AND HYDROCHLOROTHIAZIDE 10; 25 MG/1; MG/1
1 TABLET ORAL DAILY
Qty: 90 TABLET | Refills: 0 | Status: SHIPPED | OUTPATIENT
Start: 2019-04-29 | End: 2019-07-17

## 2019-04-29 RX ORDER — ESCITALOPRAM OXALATE 5 MG/1
5 TABLET ORAL DAILY
Qty: 90 TABLET | Refills: 0 | OUTPATIENT
Start: 2019-04-29

## 2019-04-29 NOTE — PROGRESS NOTES
Karlo Bernstein is a 47year old female. Patient presents with: Follow - Up: medicaton refill       HPI:   Patient is doing well on escitalopram. She feels likes she is doing well enough to wean off this medication. She would like to try this.  Doing w Calcium 200 MG Oral Tab Take by mouth.  Disp:  Rfl:        SOCIAL HISTORY:   Social History    Socioeconomic History      Marital status:       Spouse name: Not on file      Number of children: Not on file      Years of education: Not on file • Other surgical history  05/07/8401    umbilical mass        PHYSICAL EXAM:      04/29/19  1827   BP: 132/78   BP Location: Right arm   Patient Position: Sitting   Pulse: 76   Temp: 98.5 °F (36.9 °C)   SpO2: 99%   Weight: 223 lb 3.2 oz   Height: 65\" restarts it. Encouraged patient to check BP daily and bring log in for my review. Recommended medical nutrition therapy to help with weight loss and blood pressure. Given further recommendations as below.     Orders Placed This Visit:  No orders

## 2019-04-29 NOTE — PATIENT INSTRUCTIONS
Cut pill in half. Take half dose for 2 weeks. Then stop pill. If having anxiety or depression symptoms let me know.

## 2019-07-17 DIAGNOSIS — I10 ESSENTIAL HYPERTENSION: ICD-10-CM

## 2019-07-17 RX ORDER — ENALAPRIL MALEATE AND HYDROCHLOROTHIAZIDE 10; 25 MG/1; MG/1
TABLET ORAL
Qty: 90 TABLET | Refills: 0 | Status: SHIPPED | OUTPATIENT
Start: 2019-07-17 | End: 2019-10-04

## 2019-09-12 ENCOUNTER — OFFICE VISIT (OUTPATIENT)
Dept: INTEGRATIVE MEDICINE | Facility: CLINIC | Age: 54
End: 2019-09-12
Payer: COMMERCIAL

## 2019-09-12 VITALS
OXYGEN SATURATION: 98 % | RESPIRATION RATE: 17 BRPM | WEIGHT: 230 LBS | HEIGHT: 65 IN | HEART RATE: 84 BPM | SYSTOLIC BLOOD PRESSURE: 128 MMHG | TEMPERATURE: 99 F | DIASTOLIC BLOOD PRESSURE: 82 MMHG | BODY MASS INDEX: 38.32 KG/M2

## 2019-09-12 DIAGNOSIS — I10 ESSENTIAL HYPERTENSION: ICD-10-CM

## 2019-09-12 DIAGNOSIS — J40 BRONCHITIS: ICD-10-CM

## 2019-09-12 DIAGNOSIS — F33.0 MILD EPISODE OF RECURRENT MAJOR DEPRESSIVE DISORDER (HCC): ICD-10-CM

## 2019-09-12 DIAGNOSIS — E66.9 OBESITY (BMI 30-39.9): Primary | ICD-10-CM

## 2019-09-12 PROCEDURE — 90686 IIV4 VACC NO PRSV 0.5 ML IM: CPT | Performed by: FAMILY MEDICINE

## 2019-09-12 PROCEDURE — 90471 IMMUNIZATION ADMIN: CPT | Performed by: FAMILY MEDICINE

## 2019-09-12 PROCEDURE — 99214 OFFICE O/P EST MOD 30 MIN: CPT | Performed by: FAMILY MEDICINE

## 2019-09-12 RX ORDER — MONTELUKAST SODIUM 10 MG/1
10 TABLET ORAL NIGHTLY
Qty: 90 TABLET | Refills: 0 | Status: SHIPPED | OUTPATIENT
Start: 2019-09-12 | End: 2019-11-01 | Stop reason: ALTCHOICE

## 2019-09-12 RX ORDER — ESCITALOPRAM OXALATE 5 MG/1
2.5 TABLET ORAL DAILY
Qty: 90 TABLET | Refills: 0 | Status: SHIPPED | OUTPATIENT
Start: 2019-09-12 | End: 2019-12-12

## 2019-09-12 RX ORDER — FLUTICASONE PROPIONATE 50 MCG
2 SPRAY, SUSPENSION (ML) NASAL DAILY
Qty: 1 BOTTLE | Refills: 1 | Status: SHIPPED | OUTPATIENT
Start: 2019-09-12 | End: 2019-12-12

## 2019-09-12 NOTE — PATIENT INSTRUCTIONS
The products and items listed below (the “Products”)  and their claims have not been evaluated by the Food and Drug Administration. Dietary products are not intended to treat, prevent, mitigate or cure disease.  Ultimately, you must draw your own conclusion Where to Find: [employer]esa [email or web address]  Directions: 1/4 dropperful twice daily under the tongue  Why: Cassidy Pion is a blend of full spectrum hemp oil and other essential nutrients

## 2019-09-12 NOTE — PROGRESS NOTES
Catrachito Don is a 47year old female. Patient presents with:  Chest Congestion: started about 2 weeks ago, otc Alk+ and Vit C and Ny&day qil  Eye Problem  Cough      HPI:     Ran out of the escitalopram about 2 weeks ago. Feeling ok. Mood is ok.  Not SOCIAL HISTORY:   Social History    Socioeconomic History      Marital status:       Spouse name: Not on file      Number of children: Not on file      Years of education: Not on file      Highest education level: Not on file    Occupational Hist PHYSICAL EXAM:      09/12/19  1306   BP: 128/82   BP Location: Right arm   Pulse: 84   Resp: 17   Temp: 99 °F (37.2 °C)   TempSrc: Oral   SpO2: 98%   Weight: 230 lb   Height: 65\"       Physical Exam   Constitutional: She is oriented to person, place, The products and items listed below (the “Products”)  and their claims have not been evaluated by the Food and Drug Administration. Dietary products are not intended to treat, prevent, mitigate or cure disease.  Ultimately, you must draw your own conclusion Where to Find: Edwin [employer]uffer. [email or web address]  Directions: 1/4 dropperful twice daily under the tongue  Why: Francisco Castro is a blend of full spectrum hemp oil and other essential nutrients        Return in about 3 months (around 12/12/2019) for Complete Physica

## 2019-09-30 ENCOUNTER — TELEPHONE (OUTPATIENT)
Dept: INTEGRATIVE MEDICINE | Facility: CLINIC | Age: 54
End: 2019-09-30

## 2019-10-04 DIAGNOSIS — I10 ESSENTIAL HYPERTENSION: ICD-10-CM

## 2019-10-04 RX ORDER — ENALAPRIL MALEATE AND HYDROCHLOROTHIAZIDE 10; 25 MG/1; MG/1
TABLET ORAL
Qty: 90 TABLET | Refills: 0 | Status: SHIPPED
Start: 2019-10-04 | End: 2019-10-17

## 2019-10-04 NOTE — TELEPHONE ENCOUNTER
A refill request was received for:  Requested Prescriptions     Pending Prescriptions Disp Refills   • ENALAPRIL-HYDROCHLOROTHIAZIDE 10-25 MG Oral Tab [Pharmacy Med Name: ENALAPR/HCTZ TAB 10-25MG] 90 tablet 0     Sig: TAKE 1 TABLET DAILY     Last refill da

## 2019-10-17 DIAGNOSIS — I10 ESSENTIAL HYPERTENSION: ICD-10-CM

## 2019-10-17 RX ORDER — ENALAPRIL MALEATE AND HYDROCHLOROTHIAZIDE 10; 25 MG/1; MG/1
1 TABLET ORAL
Qty: 90 TABLET | Refills: 1 | Status: SHIPPED | OUTPATIENT
Start: 2019-10-17 | End: 2019-12-12

## 2019-11-01 ENCOUNTER — OFFICE VISIT (OUTPATIENT)
Dept: SURGERY | Facility: CLINIC | Age: 54
End: 2019-11-01
Payer: COMMERCIAL

## 2019-11-01 VITALS
OXYGEN SATURATION: 96 % | HEIGHT: 65 IN | HEART RATE: 80 BPM | BODY MASS INDEX: 37.59 KG/M2 | DIASTOLIC BLOOD PRESSURE: 87 MMHG | WEIGHT: 225.63 LBS | SYSTOLIC BLOOD PRESSURE: 125 MMHG

## 2019-11-01 DIAGNOSIS — K59.00 CONSTIPATION, UNSPECIFIED CONSTIPATION TYPE: ICD-10-CM

## 2019-11-01 DIAGNOSIS — E78.1 HYPERTRIGLYCERIDEMIA: ICD-10-CM

## 2019-11-01 DIAGNOSIS — E66.9 OBESITY (BMI 30-39.9): ICD-10-CM

## 2019-11-01 DIAGNOSIS — R63.5 WEIGHT GAIN: ICD-10-CM

## 2019-11-01 DIAGNOSIS — R73.03 PREDIABETES: ICD-10-CM

## 2019-11-01 DIAGNOSIS — Z86.59 HISTORY OF DEPRESSION: ICD-10-CM

## 2019-11-01 DIAGNOSIS — I10 ESSENTIAL HYPERTENSION: Primary | ICD-10-CM

## 2019-11-01 DIAGNOSIS — Z87.442 PERSONAL HISTORY OF KIDNEY STONES: ICD-10-CM

## 2019-11-01 PROCEDURE — 99203 OFFICE O/P NEW LOW 30 MIN: CPT | Performed by: NURSE PRACTITIONER

## 2019-11-01 RX ORDER — TOPIRAMATE 25 MG/1
25 TABLET ORAL 2 TIMES DAILY
Qty: 30 TABLET | Refills: 1 | Status: SHIPPED | OUTPATIENT
Start: 2019-11-01 | End: 2019-11-19

## 2019-11-01 RX ORDER — PNV NO.95/FERROUS FUM/FOLIC AC 28MG-0.8MG
TABLET ORAL
COMMUNITY

## 2019-11-01 RX ORDER — PHENTERMINE HYDROCHLORIDE 15 MG/1
15 CAPSULE ORAL EVERY MORNING
Qty: 30 CAPSULE | Refills: 0 | Status: SHIPPED
Start: 2019-11-01 | End: 2019-12-22

## 2019-11-01 NOTE — PATIENT INSTRUCTIONS
Consider hynotherapy for quitting smoking. Start magnesium 200-400 mg/day; [facility] is a good.  ([address] [facility] [address])  Start a multivitamin ([employer])  Vitamin D 4000 IU capsule during winter months, 2000 IU in summer months    Aim for 25 of sugar or les healthy fats: olives, fatty fish, olive oil, seeds, nuts, avocado, coconut oil. Start Vyvanse 20 mg by mouth in the AM.    Two weeks later, start topiramate 25 mg in the evening. Must avoid pregnancy during use of medications.     Return to clinic in

## 2019-11-01 NOTE — PROGRESS NOTES
The Wellness and Weight Loss Consultation Note       Date of Consult:  2019    Patient:  Julia Neumann  :      1965  MRN:      [id number]    Referring Provider: Dr. Michelle Deutsch       Chief Complaint:  Patient presents with:  Consult  Weight M mouth once daily. , Disp: 90 tablet, Rfl: 1  escitalopram 5 MG Oral Tab, Take 0.5 tablets (2.5 mg total) by mouth daily. , Disp: 90 tablet, Rfl: 0  Cholecalciferol (VITAMIN D) 1000 units Oral Tab, Take by mouth., Disp: , Rfl:   KLOR-CON 10 10 MEQ Oral Tab CR Emotionally abused: Not on file        Physically abused: Not on file        Forced sexual activity: Not on file    Other Topics      Concerns:        Not on file    Social History Narrative      Lives with younger brother in house in [facility] [facility]      1 son ( obvious abnormality, atraumatic  Neck: no adenopathy, no carotid bruit, no JVD, supple, symmetrical, trachea midline and thyroid not enlarged, symmetric, no tenderness/mass/nodules  Lungs: clear to auscultation bilaterally  Heart: S1, S2 normal, no murmur, INITIAL, DIET (INTERNAL)  -     C-REACTIVE PROTEIN; Future  -     LEPTIN, SERUM; Future  -     topiramate 25 MG Oral Tab; Take 1 tablet (25 mg total) by mouth 2 (two) times daily. Weight gain  -     Phentermine HCl 15 MG Oral Cap;  Take 1 capsule (15 mg week.   Discussed the importance of whole food, plant based, minimally to non-processed diet rich in fruits, vegetables, whole grains and healthy fats, and meats/seafood without hormones, steroids, or antibiotics.      Avoid processed, poor quality carbohyd

## 2019-11-13 ENCOUNTER — TELEPHONE (OUTPATIENT)
Dept: SURGERY | Facility: CLINIC | Age: 54
End: 2019-11-13

## 2019-11-13 NOTE — TELEPHONE ENCOUNTER
Per the request of Romel ESTRADA, called and spoke to patient about her insurance and Dietitian services. Patient does have benefits. Patient will look at her schedule and call dept back at her convenience to schedule. She was appreciative of the call.

## 2019-11-19 DIAGNOSIS — R63.5 WEIGHT GAIN: ICD-10-CM

## 2019-11-19 DIAGNOSIS — E66.9 OBESITY (BMI 30-39.9): ICD-10-CM

## 2019-11-19 RX ORDER — TOPIRAMATE 25 MG/1
25 TABLET ORAL 2 TIMES DAILY
Qty: 30 TABLET | Refills: 1 | Status: SHIPPED | OUTPATIENT
Start: 2019-11-19 | End: 2019-12-22

## 2019-11-27 ENCOUNTER — NURSE ONLY (OUTPATIENT)
Dept: HEMATOLOGY/ONCOLOGY | Facility: HOSPITAL | Age: 54
End: 2019-11-27
Attending: NURSE PRACTITIONER
Payer: COMMERCIAL

## 2019-11-27 ENCOUNTER — OFFICE VISIT (OUTPATIENT)
Dept: SURGERY | Facility: CLINIC | Age: 54
End: 2019-11-27
Payer: COMMERCIAL

## 2019-11-27 VITALS — HEIGHT: 65 IN | BODY MASS INDEX: 36.89 KG/M2 | WEIGHT: 221.38 LBS

## 2019-11-27 DIAGNOSIS — E78.1 HYPERTRIGLYCERIDEMIA: ICD-10-CM

## 2019-11-27 DIAGNOSIS — K59.00 CONSTIPATION, UNSPECIFIED CONSTIPATION TYPE: ICD-10-CM

## 2019-11-27 DIAGNOSIS — E66.09 CLASS 2 OBESITY DUE TO EXCESS CALORIES WITH BODY MASS INDEX (BMI) OF 36.0 TO 36.9 IN ADULT, UNSPECIFIED WHETHER SERIOUS COMORBIDITY PRESENT: Primary | ICD-10-CM

## 2019-11-27 DIAGNOSIS — E66.9 OBESITY (BMI 30-39.9): ICD-10-CM

## 2019-11-27 DIAGNOSIS — R73.03 PREDIABETES: ICD-10-CM

## 2019-11-27 DIAGNOSIS — I10 ESSENTIAL HYPERTENSION: ICD-10-CM

## 2019-11-27 DIAGNOSIS — Z86.59 HISTORY OF DEPRESSION: ICD-10-CM

## 2019-11-27 DIAGNOSIS — Z87.442 PERSONAL HISTORY OF KIDNEY STONES: ICD-10-CM

## 2019-11-27 DIAGNOSIS — R63.5 WEIGHT GAIN: ICD-10-CM

## 2019-11-27 PROCEDURE — 36415 COLL VENOUS BLD VENIPUNCTURE: CPT

## 2019-11-27 PROCEDURE — 86140 C-REACTIVE PROTEIN: CPT

## 2019-11-27 PROCEDURE — 82397 CHEMILUMINESCENT ASSAY: CPT

## 2019-11-27 PROCEDURE — 97802 MEDICAL NUTRITION INDIV IN: CPT | Performed by: HEALTH EDUCATOR

## 2019-11-27 PROCEDURE — 82607 VITAMIN B-12: CPT

## 2019-11-27 NOTE — PROGRESS NOTES
INITIAL OUTPATIENT NUTRITION CONSULTATION    Nutrition Assessment    Medical Diagnosis: Obesity    Physical Findings: none reported    Client Age and Gender: 47year old female    Marital Status and Occupation: single and employed      Meds:  [facility] >400 mg/dl. A Direct LDL will be reflexed and reported.            HDL Cholesterol   Date Value Ref Range Status   12/04/2018 34 mg/dL Final     Comment:       Cardiovascular Risk  Low: > or = 60 mg/dL  High: < or = 40 mg/dL     AST   Date Value Ref Rang evaluation provided for weight loss. Pt has been struggling with weight loss mostly as an adult. Tried [employer] but with no sustained success. Started seeing Serenity Maloney ~ 1 month ago and has seen some weight loss since then. Her stated goal is to \"eat better. \

## 2019-12-06 ENCOUNTER — TELEPHONE (OUTPATIENT)
Dept: SURGERY | Facility: CLINIC | Age: 54
End: 2019-12-06

## 2019-12-06 NOTE — TELEPHONE ENCOUNTER
Informed patient leptin level and crp levels are elevated- take topiramate as discussed- will MyChart Message inflammation plan. B 12 level in range. All questions answered.

## 2019-12-12 ENCOUNTER — OFFICE VISIT (OUTPATIENT)
Dept: INTEGRATIVE MEDICINE | Facility: CLINIC | Age: 54
End: 2019-12-12
Payer: COMMERCIAL

## 2019-12-12 VITALS
OXYGEN SATURATION: 98 % | WEIGHT: 221.38 LBS | DIASTOLIC BLOOD PRESSURE: 76 MMHG | SYSTOLIC BLOOD PRESSURE: 114 MMHG | HEIGHT: 65 IN | HEART RATE: 74 BPM | BODY MASS INDEX: 36.89 KG/M2

## 2019-12-12 DIAGNOSIS — F33.0 MILD EPISODE OF RECURRENT MAJOR DEPRESSIVE DISORDER (HCC): ICD-10-CM

## 2019-12-12 DIAGNOSIS — E66.9 OBESITY (BMI 30-39.9): ICD-10-CM

## 2019-12-12 DIAGNOSIS — I10 ESSENTIAL HYPERTENSION: ICD-10-CM

## 2019-12-12 DIAGNOSIS — R79.0 LOW FERRITIN: ICD-10-CM

## 2019-12-12 DIAGNOSIS — R79.82 ELEVATED C-REACTIVE PROTEIN (CRP): ICD-10-CM

## 2019-12-12 DIAGNOSIS — E78.1 HYPERTRIGLYCERIDEMIA: ICD-10-CM

## 2019-12-12 DIAGNOSIS — R73.03 PREDIABETES: ICD-10-CM

## 2019-12-12 DIAGNOSIS — Z00.00 ROUTINE MEDICAL EXAM: Primary | ICD-10-CM

## 2019-12-12 PROCEDURE — 99396 PREV VISIT EST AGE 40-64: CPT | Performed by: FAMILY MEDICINE

## 2019-12-12 RX ORDER — ENALAPRIL MALEATE AND HYDROCHLOROTHIAZIDE 10; 25 MG/1; MG/1
0.5 TABLET ORAL
Qty: 90 TABLET | Refills: 1 | COMMUNITY
Start: 2019-12-12 | End: 2020-04-03

## 2019-12-12 NOTE — PATIENT INSTRUCTIONS
I have complete taryn in the body's ability to heal and transform. The products and items listed below (the “Products”)  and their claims have not been evaluated by the Food and Drug Administration.  Dietary products are not intended to treat, prevent, m is “Pranayama,” which provides sound prompts to help you follow your inhale and exhale.  There are hundreds of different applications available to help you relax, including “Breathe to Relax” and “Insight Timer.”  · If you have specific Synagogue beliefs, p entire exercise. Exhale through your mouth around your tongue; try pursing your lips slightly if this seems awkward. STEPS    1. Exhale completely through your mouth, making a whoosh sound.     2. Close your mouth and inhale quietly through your nose

## 2019-12-12 NOTE — PROGRESS NOTES
Alonzo Tirado is a 47year old female. Patient presents with:  Physical: no pap      HPI:     Has lost 9 lbs since her last visit with me. Using phentermine and topiramate. Felt that with the escitalopram it made her light-headed.    Weaned off the diagnosed 2018       IMMUNIZATION HISTORY:     Immunization History   Administered Date(s) Administered   • FLULAVAL 6 months & older 0.5 ml Prefilled syringe (80651) 09/12/2019   • Influenza 10/02/2018   • Pneumovax 23 07/12/2016   • TDAP 09/09/2015, Minutes per session: Not on file      Stress: Not on file    Relationships      Social connections:        Talks on phone: Not on file        Gets together: Not on file        Attends Islam service: Not on file        Active member of club or [place] abnormalities     Abdominal: Soft. Bowel sounds are normal. She exhibits no distension and no mass. There is no tenderness. Genitourinary:    Vagina and uterus normal.   Musculoskeletal: Normal range of motion.      Lymphadenopathy:     She has no cervica claims have not been evaluated by the Food and Drug Administration. Dietary products are not intended to treat, prevent, mitigate or cure disease.  Ultimately, you must draw your own conclusion as to the efficacy of the Product and immediately stop use of t available to help you relax, including “Breathe to Relax” and “Insight Timer.”  · If you have specific Mosque beliefs, prayer can be a form of meditation. Repeating prayers or mantras can instill a sense of peace (use a rosary bead or jaya).    · Becca 1. Exhale completely through your mouth, making a whoosh sound.     2. Close your mouth and inhale quietly through your nose to a mental count of 4.    3. Hold your breath for a count of 7.    4. Exhale completely through your mouth, making a whoosh soun

## 2019-12-16 ENCOUNTER — NURSE ONLY (OUTPATIENT)
Dept: HEMATOLOGY/ONCOLOGY | Facility: HOSPITAL | Age: 54
End: 2019-12-16
Attending: NURSE PRACTITIONER
Payer: COMMERCIAL

## 2019-12-16 DIAGNOSIS — Z00.00 ROUTINE MEDICAL EXAM: ICD-10-CM

## 2019-12-16 DIAGNOSIS — R79.0 LOW FERRITIN: ICD-10-CM

## 2019-12-16 DIAGNOSIS — R73.03 PREDIABETES: ICD-10-CM

## 2019-12-16 DIAGNOSIS — E78.1 HYPERTRIGLYCERIDEMIA: ICD-10-CM

## 2019-12-16 PROCEDURE — 36415 COLL VENOUS BLD VENIPUNCTURE: CPT

## 2019-12-16 PROCEDURE — 82728 ASSAY OF FERRITIN: CPT

## 2019-12-16 PROCEDURE — 83036 HEMOGLOBIN GLYCOSYLATED A1C: CPT

## 2019-12-16 PROCEDURE — 80061 LIPID PANEL: CPT

## 2019-12-16 PROCEDURE — 84443 ASSAY THYROID STIM HORMONE: CPT

## 2019-12-16 PROCEDURE — 82306 VITAMIN D 25 HYDROXY: CPT

## 2019-12-16 PROCEDURE — 80053 COMPREHEN METABOLIC PANEL: CPT

## 2019-12-16 PROCEDURE — 85025 COMPLETE CBC W/AUTO DIFF WBC: CPT

## 2019-12-22 DIAGNOSIS — R73.03 PREDIABETES: ICD-10-CM

## 2019-12-22 DIAGNOSIS — E78.1 HYPERTRIGLYCERIDEMIA: ICD-10-CM

## 2019-12-22 DIAGNOSIS — E66.9 OBESITY (BMI 30-39.9): ICD-10-CM

## 2019-12-22 DIAGNOSIS — R63.5 WEIGHT GAIN: ICD-10-CM

## 2019-12-23 RX ORDER — PHENTERMINE HYDROCHLORIDE 15 MG/1
15 CAPSULE ORAL EVERY MORNING
Qty: 30 CAPSULE | Refills: 0 | Status: SHIPPED | OUTPATIENT
Start: 2019-12-23 | End: 2020-01-20 | Stop reason: DRUGHIGH

## 2019-12-23 RX ORDER — TOPIRAMATE 25 MG/1
25 TABLET ORAL DAILY
Qty: 30 TABLET | Refills: 0 | Status: SHIPPED | OUTPATIENT
Start: 2019-12-23 | End: 2020-01-20

## 2019-12-29 DIAGNOSIS — R63.5 WEIGHT GAIN: ICD-10-CM

## 2019-12-29 DIAGNOSIS — E66.9 OBESITY (BMI 30-39.9): ICD-10-CM

## 2019-12-30 RX ORDER — TOPIRAMATE 25 MG/1
TABLET ORAL
Qty: 60 TABLET | Refills: 0 | OUTPATIENT
Start: 2019-12-30

## 2020-01-20 ENCOUNTER — OFFICE VISIT (OUTPATIENT)
Dept: SURGERY | Facility: CLINIC | Age: 55
End: 2020-01-20
Payer: COMMERCIAL

## 2020-01-20 VITALS
BODY MASS INDEX: 36.64 KG/M2 | WEIGHT: 219.88 LBS | HEIGHT: 65 IN | SYSTOLIC BLOOD PRESSURE: 130 MMHG | HEART RATE: 74 BPM | DIASTOLIC BLOOD PRESSURE: 90 MMHG

## 2020-01-20 DIAGNOSIS — I10 ESSENTIAL HYPERTENSION: Primary | ICD-10-CM

## 2020-01-20 DIAGNOSIS — K59.00 CONSTIPATION, UNSPECIFIED CONSTIPATION TYPE: ICD-10-CM

## 2020-01-20 DIAGNOSIS — Z51.81 ENCOUNTER FOR THERAPEUTIC DRUG MONITORING: ICD-10-CM

## 2020-01-20 DIAGNOSIS — Z86.59 HISTORY OF DEPRESSION: ICD-10-CM

## 2020-01-20 DIAGNOSIS — Z87.442 PERSONAL HISTORY OF KIDNEY STONES: ICD-10-CM

## 2020-01-20 DIAGNOSIS — R73.03 PREDIABETES: ICD-10-CM

## 2020-01-20 DIAGNOSIS — E78.1 HYPERTRIGLYCERIDEMIA: ICD-10-CM

## 2020-01-20 DIAGNOSIS — E66.9 OBESITY (BMI 30-39.9): ICD-10-CM

## 2020-01-20 PROCEDURE — 99214 OFFICE O/P EST MOD 30 MIN: CPT | Performed by: NURSE PRACTITIONER

## 2020-01-20 RX ORDER — TOPIRAMATE 25 MG/1
25 TABLET ORAL DAILY
Qty: 180 TABLET | Refills: 0 | Status: SHIPPED | OUTPATIENT
Start: 2020-01-20 | End: 2020-01-20 | Stop reason: CLARIF

## 2020-01-20 RX ORDER — METFORMIN HYDROCHLORIDE 750 MG/1
750 TABLET, EXTENDED RELEASE ORAL
Qty: 30 TABLET | Refills: 1 | Status: SHIPPED | OUTPATIENT
Start: 2020-01-20 | End: 2020-05-08 | Stop reason: SINTOL

## 2020-01-20 RX ORDER — PHENTERMINE HYDROCHLORIDE 30 MG/1
30 CAPSULE ORAL EVERY MORNING
Qty: 30 CAPSULE | Refills: 1 | Status: SHIPPED | OUTPATIENT
Start: 2020-01-20 | End: 2020-04-03

## 2020-01-20 NOTE — PROGRESS NOTES
[address] [id number] [facility] [address]  Dept: [phone]       Patient:  Karlo Bernstein  :      1965  MRN:      [id number]    Chief Complaint:  Patient present oz (102.3 kg)  09/12/19 : 230 lb (104.3 kg)  04/29/19 : 223 lb 3.2 oz (101.2 kg)      Patient Medications:    Current Outpatient Medications   Medication Sig Dispense Refill   • metFORMIN HCl  MG Oral Tablet 24 Hr Take 1 tablet (750 mg total) by william Attends meetings of clubs or organizations: Not on file        Relationship status: Not on file      Intimate partner violence:        Fear of current or ex partner: Not on file        Emotionally abused: Not on file        Physically abused: Not on christy (down to 1 tsp sugar now)  B: Fruit, yogurt   L: Homemade, chicken salad- rice cake  D: Chili     Nutritional Goals  Calorie-controlled diet:  per nutritionist , Limit carbohydrates to per nutritionist  gms per day, Eat 100-200 calories within 1 hour of wa HYPERTENSION:  The patient's blood pressure is mildly elevated today.       PREDIABETES: a1c 5.7 on 10/6/2017  12/16/19 a1c 6.3     HYPERTRIGLYCERIDEMIA:  Remains elevated.        Lab Results   Component Value Date/Time    CHOLEST 187 12/16/2019 09:35 A Recommend dietary changes and lifestyle modifications as discussed below. Monitor.      Lab Results   Component Value Date/Time    CHOLEST 187 12/16/2019 09:35 AM    LDL 79 12/16/2019 09:35 AM    HDL 32 (L) 12/16/2019 09:35 AM    TRIG 378 (H) 12/16/2019 09: stop medication after reversal      Discussed risks, benefits, rationale, and side effects of medication(s) including hypertension, palpitations, tachycardia, dizziness, constipation, dry mouth, and anxiety among others. She understands that I will not sussy

## 2020-01-20 NOTE — PATIENT INSTRUCTIONS
Hold off on restarting topiramate 25 mg in the evening with dinner for now. Take Metformin 750 mg ER in the evening with dinner. Take every other night for two weeks while you adjust to the medication.     Continue phentermine, but increase to 30 mg in night.    17. Stress less. Meditate. 25. Connect with others, loneliness can contribute to disease. 19. Aim to drink at least 64 oz of water a day, you can increase this to half your body weight in ounces/day.      Aim for total daily carbohydrates: 80-

## 2020-02-11 ENCOUNTER — TELEPHONE (OUTPATIENT)
Dept: INTEGRATIVE MEDICINE | Facility: CLINIC | Age: 55
End: 2020-02-11

## 2020-02-11 DIAGNOSIS — Z12.31 ENCOUNTER FOR SCREENING MAMMOGRAM FOR BREAST CANCER: Primary | ICD-10-CM

## 2020-02-25 DIAGNOSIS — Z51.81 ENCOUNTER FOR THERAPEUTIC DRUG MONITORING: ICD-10-CM

## 2020-02-25 DIAGNOSIS — E66.9 OBESITY (BMI 30-39.9): ICD-10-CM

## 2020-02-25 RX ORDER — PHENTERMINE HYDROCHLORIDE 30 MG/1
30 CAPSULE ORAL EVERY MORNING
Qty: 30 CAPSULE | Refills: 1 | OUTPATIENT
Start: 2020-02-25

## 2020-03-12 ENCOUNTER — HOSPITAL ENCOUNTER (OUTPATIENT)
Dept: MAMMOGRAPHY | Facility: HOSPITAL | Age: 55
Discharge: HOME | End: 2020-03-12
Attending: FAMILY MEDICINE
Payer: COMMERCIAL

## 2020-03-12 DIAGNOSIS — Z12.31 ENCOUNTER FOR SCREENING MAMMOGRAM FOR BREAST CANCER: ICD-10-CM

## 2020-03-12 PROCEDURE — 77067 SCR MAMMO BI INCL CAD: CPT | Performed by: FAMILY MEDICINE

## 2020-03-12 PROCEDURE — 77063 BREAST TOMOSYNTHESIS BI: CPT | Performed by: FAMILY MEDICINE

## 2020-03-20 ENCOUNTER — TELEPHONE (OUTPATIENT)
Dept: INTEGRATIVE MEDICINE | Facility: CLINIC | Age: 55
End: 2020-03-20

## 2020-03-20 DIAGNOSIS — I10 ESSENTIAL HYPERTENSION: Primary | ICD-10-CM

## 2020-03-20 PROCEDURE — 99442 PHONE E/M BY PHYS 11-20 MIN: CPT | Performed by: PHYSICIAN ASSISTANT

## 2020-03-20 NOTE — TELEPHONE ENCOUNTER
Patient is having high blood pressure readings and is at work at the [employer],    Has had two BP readings that are high 143/101    RN has scheduled phone visit for 10 am

## 2020-03-20 NOTE — TELEPHONE ENCOUNTER
Virtual/Telephone Check-In    Zamzam BERTHA Eliseo Barger verbally consents to a Virtual/Telephone Check-In service on 03/20/20. Patient understands and accepts financial responsibility for any deductible, co-insurance and/or co-pays associated with this service.

## 2020-03-20 NOTE — TELEPHONE ENCOUNTER
Virtual/Telephone Check-In    Zamzam BERTHA Douglas Link verbally consents to a Virtual/Telephone Check-In service on 03/20/20. Patient understands and accepts financial responsibility for any deductible, co-insurance and/or co-pays associated with this service.

## 2020-04-03 DIAGNOSIS — E66.9 OBESITY (BMI 30-39.9): ICD-10-CM

## 2020-04-03 DIAGNOSIS — Z51.81 ENCOUNTER FOR THERAPEUTIC DRUG MONITORING: ICD-10-CM

## 2020-04-03 DIAGNOSIS — I10 ESSENTIAL HYPERTENSION: ICD-10-CM

## 2020-04-03 RX ORDER — ENALAPRIL MALEATE AND HYDROCHLOROTHIAZIDE 10; 25 MG/1; MG/1
0.5 TABLET ORAL
Qty: 90 TABLET | Refills: 0 | Status: SHIPPED | OUTPATIENT
Start: 2020-04-03 | End: 2020-06-19

## 2020-04-03 RX ORDER — PHENTERMINE HYDROCHLORIDE 30 MG/1
30 CAPSULE ORAL EVERY MORNING
Qty: 30 CAPSULE | Refills: 0 | Status: SHIPPED | OUTPATIENT
Start: 2020-04-03 | End: 2020-04-25

## 2020-04-11 DIAGNOSIS — E66.9 OBESITY (BMI 30-39.9): ICD-10-CM

## 2020-04-11 DIAGNOSIS — Z51.81 ENCOUNTER FOR THERAPEUTIC DRUG MONITORING: ICD-10-CM

## 2020-04-13 RX ORDER — PHENTERMINE HYDROCHLORIDE 30 MG/1
CAPSULE ORAL
Qty: 30 CAPSULE | Refills: 0 | OUTPATIENT
Start: 2020-04-13

## 2020-04-25 DIAGNOSIS — Z51.81 ENCOUNTER FOR THERAPEUTIC DRUG MONITORING: ICD-10-CM

## 2020-04-25 DIAGNOSIS — E66.9 OBESITY (BMI 30-39.9): ICD-10-CM

## 2020-04-26 DIAGNOSIS — E66.9 OBESITY (BMI 30-39.9): ICD-10-CM

## 2020-04-26 DIAGNOSIS — Z51.81 ENCOUNTER FOR THERAPEUTIC DRUG MONITORING: ICD-10-CM

## 2020-04-27 RX ORDER — PHENTERMINE HYDROCHLORIDE 30 MG/1
30 CAPSULE ORAL EVERY MORNING
Qty: 30 CAPSULE | Refills: 0 | OUTPATIENT
Start: 2020-04-27 | End: 2020-05-07

## 2020-04-28 RX ORDER — PHENTERMINE HYDROCHLORIDE 30 MG/1
30 CAPSULE ORAL EVERY MORNING
Qty: 30 CAPSULE | Refills: 0 | OUTPATIENT
Start: 2020-04-28

## 2020-05-07 RX ORDER — PHENTERMINE HYDROCHLORIDE 30 MG/1
30 CAPSULE ORAL EVERY MORNING
Qty: 15 CAPSULE | Refills: 0 | Status: SHIPPED | OUTPATIENT
Start: 2020-05-07 | End: 2020-05-08

## 2020-05-08 ENCOUNTER — VIRTUAL PHONE E/M (OUTPATIENT)
Dept: SURGERY | Facility: CLINIC | Age: 55
End: 2020-05-08

## 2020-05-08 DIAGNOSIS — Z51.81 ENCOUNTER FOR THERAPEUTIC DRUG MONITORING: Primary | ICD-10-CM

## 2020-05-08 DIAGNOSIS — Z87.442 PERSONAL HISTORY OF KIDNEY STONES: ICD-10-CM

## 2020-05-08 DIAGNOSIS — R73.03 PREDIABETES: ICD-10-CM

## 2020-05-08 DIAGNOSIS — E78.1 HYPERTRIGLYCERIDEMIA: ICD-10-CM

## 2020-05-08 DIAGNOSIS — I10 ESSENTIAL HYPERTENSION: ICD-10-CM

## 2020-05-08 DIAGNOSIS — K59.00 CONSTIPATION, UNSPECIFIED CONSTIPATION TYPE: ICD-10-CM

## 2020-05-08 DIAGNOSIS — Z86.59 HISTORY OF DEPRESSION: ICD-10-CM

## 2020-05-08 PROCEDURE — 99213 OFFICE O/P EST LOW 20 MIN: CPT | Performed by: NURSE PRACTITIONER

## 2020-05-08 RX ORDER — TOPIRAMATE 25 MG/1
25 TABLET ORAL DAILY
Qty: 10 TABLET | Refills: 0 | Status: SHIPPED | OUTPATIENT
Start: 2020-05-08 | End: 2020-06-15

## 2020-05-08 RX ORDER — TOPIRAMATE 25 MG/1
25 TABLET ORAL DAILY
Qty: 30 TABLET | Refills: 1 | Status: SHIPPED | OUTPATIENT
Start: 2020-05-08 | End: 2020-07-01

## 2020-05-08 RX ORDER — PHENTERMINE HYDROCHLORIDE 30 MG/1
30 CAPSULE ORAL EVERY MORNING
Qty: 30 CAPSULE | Refills: 1 | Status: SHIPPED | OUTPATIENT
Start: 2020-05-20 | End: 2020-06-15

## 2020-05-08 NOTE — PROGRESS NOTES
Virtual Telephone Check-In    [facility] verbally consents to a Virtual/Telephone Check-In visit on 05/08/20. Patient understands and accepts financial responsibility for any deductible, co-insurance and/or co-pays associated with this service. Unable to check blood pressure today on the above medications. Monitor closely at next follow up appointment.     HYPERTRIGLYCERIDEMIA: Recommend dietary changes and lifestyle modifications as discussed below.  Monitor.     OBESITY/WEIGHT GAIN:  PREDIABETES rationale, and side effects of medication(s) including hypertension, palpitations, tachycardia, dizziness, constipation, dry mouth, and anxiety among others. She understands that I will not call in the prescription for her; she has to have an appointment t

## 2020-05-08 NOTE — PATIENT INSTRUCTIONS
You are amazing! Exercise Goal: 10 minute walks twice a day- 5 days a week. Continue phentermine 30 mg/day in the AM    Restart topiramate 25 mg by mouth in the evening around dinnertime. Please make sure to hydrate well to avoid kidney stones.

## 2020-06-15 ENCOUNTER — OFFICE VISIT (OUTPATIENT)
Dept: SURGERY | Facility: CLINIC | Age: 55
End: 2020-06-15
Payer: COMMERCIAL

## 2020-06-15 VITALS
DIASTOLIC BLOOD PRESSURE: 82 MMHG | WEIGHT: 203.81 LBS | HEART RATE: 74 BPM | HEIGHT: 65 IN | BODY MASS INDEX: 33.96 KG/M2 | SYSTOLIC BLOOD PRESSURE: 119 MMHG | OXYGEN SATURATION: 98 %

## 2020-06-15 DIAGNOSIS — R73.03 PREDIABETES: ICD-10-CM

## 2020-06-15 DIAGNOSIS — K59.00 CONSTIPATION, UNSPECIFIED CONSTIPATION TYPE: ICD-10-CM

## 2020-06-15 DIAGNOSIS — E78.1 HYPERTRIGLYCERIDEMIA: ICD-10-CM

## 2020-06-15 DIAGNOSIS — Z51.81 ENCOUNTER FOR THERAPEUTIC DRUG MONITORING: Primary | ICD-10-CM

## 2020-06-15 DIAGNOSIS — Z86.59 HISTORY OF DEPRESSION: ICD-10-CM

## 2020-06-15 DIAGNOSIS — E66.9 OBESITY (BMI 30-39.9): ICD-10-CM

## 2020-06-15 DIAGNOSIS — Z87.442 PERSONAL HISTORY OF KIDNEY STONES: ICD-10-CM

## 2020-06-15 DIAGNOSIS — I10 ESSENTIAL HYPERTENSION: ICD-10-CM

## 2020-06-15 PROCEDURE — 99214 OFFICE O/P EST MOD 30 MIN: CPT | Performed by: NURSE PRACTITIONER

## 2020-06-15 NOTE — PROGRESS NOTES
[address] [address]  Dept: [phone]       Patient:  Edy Rawls  :      1965  MRN:      [id number]    Chief Complaint:  Patient present kg)      Patient Medications:    Current Outpatient Medications   Medication Sig Dispense Refill   • topiramate 25 MG Oral Tab Take 1 tablet (25 mg total) by mouth daily.  30 tablet 1   • Enalapril-hydroCHLOROthiazide 10-25 MG Oral Tab Take 0.5 tablets by m Intimate partner violence:        Fear of current or ex partner: Not on file        Emotionally abused: Not on file        Physically abused: Not on file        Forced sexual activity: Not on file    Other Topics      Concerns:        Not on file    Social nutritionist , Limit carbohydrates to per nutritionist  gms per day, Eat 100-200 calories within 1 hour of waking  and Eat 3-4 cups of fresh fruits or vegetables daily    Behavior Modifications Reviewed and Discussed  Eat breakfast, Eat 3 meals per day, Pl Results   Component Value Date/Time    CHOLEST 187 12/16/2019 09:35 AM    LDL 79 12/16/2019 09:35 AM    HDL 32 (L) 12/16/2019 09:35 AM    TRIG 378 (H) 12/16/2019 09:35 AM    VLDL 76 (H) 12/16/2019 09:35 AM       Encounter Diagnosis(ses):   Encounter for th Value Date/Time    CHOLEST 187 12/16/2019 09:35 AM    LDL 79 12/16/2019 09:35 AM    HDL 32 (L) 12/16/2019 09:35 AM    TRIG 378 (H) 12/16/2019 09:35 AM    VLDL 76 (H) 12/16/2019 09:35 AM     OBESITY/WEIGHT GAIN:  PREDIABETES:   a1c 5.7 on 10/6/2017;  Must avoid pregnancy during use, counseled on adequate contraception during use. Patient states understanding of all information and instructions. s/p Tubal ligation.     Consider Vyvanse in the future as needed.     Consider Saxenda with NOOM behavior mo

## 2020-06-19 ENCOUNTER — OFFICE VISIT (OUTPATIENT)
Dept: INTEGRATIVE MEDICINE | Facility: CLINIC | Age: 55
End: 2020-06-19
Payer: COMMERCIAL

## 2020-06-19 VITALS
HEIGHT: 65 IN | RESPIRATION RATE: 12 BRPM | SYSTOLIC BLOOD PRESSURE: 120 MMHG | HEART RATE: 70 BPM | BODY MASS INDEX: 32.99 KG/M2 | WEIGHT: 198 LBS | TEMPERATURE: 99 F | DIASTOLIC BLOOD PRESSURE: 78 MMHG

## 2020-06-19 DIAGNOSIS — I10 ESSENTIAL HYPERTENSION: Primary | ICD-10-CM

## 2020-06-19 DIAGNOSIS — R73.03 PREDIABETES: ICD-10-CM

## 2020-06-19 DIAGNOSIS — E55.9 VITAMIN D DEFICIENCY: ICD-10-CM

## 2020-06-19 DIAGNOSIS — E78.1 HYPERTRIGLYCERIDEMIA: ICD-10-CM

## 2020-06-19 DIAGNOSIS — E66.9 OBESITY (BMI 30-39.9): ICD-10-CM

## 2020-06-19 PROCEDURE — 99214 OFFICE O/P EST MOD 30 MIN: CPT | Performed by: PHYSICIAN ASSISTANT

## 2020-06-19 RX ORDER — ENALAPRIL MALEATE AND HYDROCHLOROTHIAZIDE 10; 25 MG/1; MG/1
0.5 TABLET ORAL
Qty: 90 TABLET | Refills: 1 | Status: SHIPPED | OUTPATIENT
Start: 2020-06-19 | End: 2020-10-13

## 2020-06-19 NOTE — PROGRESS NOTES
[address] is a 49year old female. Patient presents with: Follow - Up: weight loss and check in       HPI:   Patient presents for follow up. She had to increase her BP medication to full tablet due to high blood pressure.  She is loosing weight a Ferrous Sulfate (IRON) 325 (65 Fe) MG Oral Tab Take by mouth. • Cholecalciferol (VITAMIN D) 1000 units Oral Tab Take by mouth.      • KLOR-CON 10 10 MEQ Oral Tab CR          SOCIAL HISTORY:   Social History    Socioeconomic History      Marital status: HISTORY:     Past Surgical History:   Procedure Laterality Date   • Micheal localization wire 1 site left (cpt=19281)  02/2014    benign   • Other surgical history  33/80/7701    umbilical mass        PHYSICAL EXAM:      06/19/20  0817   BP: 120/78   Pulse: 70 medication. BP medication refilled. Given further recommendations as below. Orders Placed This Visit:  Orders Placed This Encounter      Ferritin [E]      Vitamin D, 25-Hydroxy [E]      There are no Patient Instructions on file for this visit.

## 2020-06-25 ENCOUNTER — NURSE ONLY (OUTPATIENT)
Dept: HEMATOLOGY/ONCOLOGY | Facility: HOSPITAL | Age: 55
End: 2020-06-25
Attending: NURSE PRACTITIONER
Payer: COMMERCIAL

## 2020-06-25 ENCOUNTER — TELEPHONE (OUTPATIENT)
Dept: SURGERY | Facility: CLINIC | Age: 55
End: 2020-06-25

## 2020-06-25 DIAGNOSIS — E66.9 OBESITY (BMI 30-39.9): Primary | ICD-10-CM

## 2020-06-25 DIAGNOSIS — E55.9 VITAMIN D DEFICIENCY: ICD-10-CM

## 2020-06-25 DIAGNOSIS — Z86.59 HISTORY OF DEPRESSION: ICD-10-CM

## 2020-06-25 DIAGNOSIS — E66.9 OBESITY (BMI 30-39.9): ICD-10-CM

## 2020-06-25 DIAGNOSIS — E78.1 HYPERTRIGLYCERIDEMIA: ICD-10-CM

## 2020-06-25 DIAGNOSIS — Z87.442 PERSONAL HISTORY OF KIDNEY STONES: ICD-10-CM

## 2020-06-25 DIAGNOSIS — R73.03 PREDIABETES: ICD-10-CM

## 2020-06-25 DIAGNOSIS — Z51.81 ENCOUNTER FOR THERAPEUTIC DRUG MONITORING: ICD-10-CM

## 2020-06-25 DIAGNOSIS — K59.00 CONSTIPATION, UNSPECIFIED CONSTIPATION TYPE: ICD-10-CM

## 2020-06-25 DIAGNOSIS — I10 ESSENTIAL HYPERTENSION: ICD-10-CM

## 2020-06-25 PROCEDURE — 36415 COLL VENOUS BLD VENIPUNCTURE: CPT

## 2020-06-25 PROCEDURE — 82728 ASSAY OF FERRITIN: CPT

## 2020-06-25 PROCEDURE — 80061 LIPID PANEL: CPT

## 2020-06-25 PROCEDURE — 83036 HEMOGLOBIN GLYCOSYLATED A1C: CPT

## 2020-06-25 PROCEDURE — 82306 VITAMIN D 25 HYDROXY: CPT

## 2020-06-25 PROCEDURE — 80048 BASIC METABOLIC PNL TOTAL CA: CPT

## 2020-06-26 NOTE — PROGRESS NOTES
ROSALIA Wyman,     I hope your are doing well. Your vitamin D looks great and much improved overall. I would continue to take vitamin D daily to maintain healthy levels. Your Ferritin or iron levels are ok but could improve.  I would restart iron with maryjane

## 2020-06-30 ENCOUNTER — TELEMEDICINE (OUTPATIENT)
Dept: INTEGRATIVE MEDICINE | Facility: CLINIC | Age: 55
End: 2020-06-30
Payer: COMMERCIAL

## 2020-06-30 DIAGNOSIS — R79.0 LOW FERRITIN: ICD-10-CM

## 2020-06-30 DIAGNOSIS — I10 ESSENTIAL HYPERTENSION: Primary | ICD-10-CM

## 2020-06-30 PROCEDURE — 99213 OFFICE O/P EST LOW 20 MIN: CPT | Performed by: PHYSICIAN ASSISTANT

## 2020-06-30 NOTE — PATIENT INSTRUCTIONS
Your ferritin level is low. Ferritin is the storage form of iron. This being low can greatly impact energy levels in the body.    In order to improve your ferritin level as well as the symptoms associated with it I have the following recommendations:    ~ T

## 2020-06-30 NOTE — PROGRESS NOTES
Tim Chase is a 49year old female. Patient presents with:  Lab Results      HPI:   Patient presents to follow up on labs.  She was terminated from her job recently and is looking to file a wrongful termination complaint because she feels that she • topiramate 25 MG Oral Tab Take 1 tablet (25 mg total) by mouth daily. 30 tablet 1   • Ferrous Sulfate (IRON) 325 (65 Fe) MG Oral Tab Take by mouth. • Cholecalciferol (VITAMIN D) 1000 units Oral Tab Take by mouth.      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 Patient affirmed understanding of plan and all questions were answered.      NORA Calabrese

## 2020-07-01 RX ORDER — TOPIRAMATE 25 MG/1
25 TABLET ORAL DAILY
Qty: 30 TABLET | Refills: 1 | Status: SHIPPED | OUTPATIENT
Start: 2020-07-01 | End: 2020-10-13

## 2020-09-09 DIAGNOSIS — I10 ESSENTIAL HYPERTENSION: Primary | ICD-10-CM

## 2020-09-10 ENCOUNTER — TELEMEDICINE (OUTPATIENT)
Dept: INTEGRATIVE MEDICINE | Facility: CLINIC | Age: 55
End: 2020-09-10
Payer: COMMERCIAL

## 2020-09-10 DIAGNOSIS — F41.9 ANXIETY: ICD-10-CM

## 2020-09-10 DIAGNOSIS — I10 ESSENTIAL HYPERTENSION: Primary | ICD-10-CM

## 2020-09-10 PROCEDURE — 99214 OFFICE O/P EST MOD 30 MIN: CPT | Performed by: PHYSICIAN ASSISTANT

## 2020-09-10 RX ORDER — ENALAPRIL MALEATE 20 MG/1
20 TABLET ORAL DAILY
Qty: 90 TABLET | Refills: 0 | Status: SHIPPED | OUTPATIENT
Start: 2020-09-10 | End: 2020-12-02

## 2020-09-10 RX ORDER — AMLODIPINE BESYLATE 5 MG/1
5 TABLET ORAL DAILY
Qty: 90 TABLET | Refills: 0 | Status: SHIPPED | OUTPATIENT
Start: 2020-09-10 | End: 2020-12-02

## 2020-09-10 RX ORDER — HYDROCHLOROTHIAZIDE 25 MG/1
25 TABLET ORAL DAILY
Qty: 90 TABLET | Refills: 0 | Status: SHIPPED | OUTPATIENT
Start: 2020-09-10 | End: 2020-12-02

## 2020-09-10 RX ORDER — ESCITALOPRAM OXALATE 5 MG/1
5 TABLET ORAL DAILY
Qty: 90 TABLET | Refills: 0 | Status: SHIPPED | OUTPATIENT
Start: 2020-09-10 | End: 2020-10-13

## 2020-09-10 RX ORDER — ENALAPRIL MALEATE AND HYDROCHLOROTHIAZIDE 10; 25 MG/1; MG/1
TABLET ORAL
Qty: 90 TABLET | Refills: 3 | OUTPATIENT
Start: 2020-09-10

## 2020-09-10 NOTE — PROGRESS NOTES
Sarah Beth Jang is a 49year old female. Patient presents with:  Blood Pressure      HPI:   Patient presents with blood pressure concern. It has been up a lot. Checked it at home and was 190/100.  Took a second blood pressure pill and went down to 170/1 5 MG Oral Tab Take 1 tablet (5 mg total) by mouth daily. 90 tablet 0   • escitalopram (LEXAPRO) 5 MG Oral Tab Take 1 tablet (5 mg total) by mouth daily. 90 tablet 0   • topiramate 25 MG Oral Tab Take 1 tablet (25 mg total) by mouth daily.  30 tablet 1   • E Physically abused: Not on file        Forced sexual activity: Not on file    Other Topics      Concerns:        Not on file    Social History Narrative      Lives with younger brother in house in [facility]      1 son (aged 23 yo - 9/2017)      1 sister - ha Given further recommendations as below. Orders Placed This Visit:  No orders of the defined types were placed in this encounter. There are no Patient Instructions on file for this visit.      Return in about 3 months (around 12/10/2020) for [facility]

## 2020-09-10 NOTE — PATIENT INSTRUCTIONS
Home Blood Pressure Monitoring  Please purchase a home blood pressure cuff (Omron is a good brand). Keep a daily log of your blood pressure. Take your blood pressure throughout the day.   Goal blood pressure is <140/90    Tips  - Don't measure your blood

## 2020-09-14 ENCOUNTER — TELEPHONE (OUTPATIENT)
Dept: SURGERY | Facility: CLINIC | Age: 55
End: 2020-09-14

## 2020-09-14 RX ORDER — TOPIRAMATE 25 MG/1
TABLET ORAL
Qty: 90 TABLET | Refills: 0 | OUTPATIENT
Start: 2020-09-14

## 2020-10-12 ENCOUNTER — TELEPHONE (OUTPATIENT)
Dept: INTEGRATIVE MEDICINE | Facility: CLINIC | Age: 55
End: 2020-10-12

## 2020-10-12 NOTE — TELEPHONE ENCOUNTER
Please call patient and schedule patient to be seen with Dr Gracy Salvador this week, Dr Gracy Salvador stated she can be squeezed in regarding her BP, Pt was seen in [address] [place] [address].

## 2020-10-12 NOTE — TELEPHONE ENCOUNTER
Dr. Moreno Dwyer saw pt in  regarding blood pressure. Dr. Moreno Dwyer is concerned about her high blood and pts next appt in November.

## 2020-10-13 ENCOUNTER — OFFICE VISIT (OUTPATIENT)
Dept: INTEGRATIVE MEDICINE | Facility: CLINIC | Age: 55
End: 2020-10-13
Payer: MEDICAID

## 2020-10-13 VITALS
OXYGEN SATURATION: 98 % | BODY MASS INDEX: 31.96 KG/M2 | WEIGHT: 191.81 LBS | DIASTOLIC BLOOD PRESSURE: 90 MMHG | SYSTOLIC BLOOD PRESSURE: 140 MMHG | HEIGHT: 65 IN | HEART RATE: 64 BPM

## 2020-10-13 DIAGNOSIS — F41.9 ANXIETY: ICD-10-CM

## 2020-10-13 DIAGNOSIS — I10 ESSENTIAL HYPERTENSION: Primary | ICD-10-CM

## 2020-10-13 PROCEDURE — 3080F DIAST BP >= 90 MM HG: CPT | Performed by: FAMILY MEDICINE

## 2020-10-13 PROCEDURE — 99213 OFFICE O/P EST LOW 20 MIN: CPT | Performed by: FAMILY MEDICINE

## 2020-10-13 PROCEDURE — 3077F SYST BP >= 140 MM HG: CPT | Performed by: FAMILY MEDICINE

## 2020-10-13 PROCEDURE — 3008F BODY MASS INDEX DOCD: CPT | Performed by: FAMILY MEDICINE

## 2020-10-13 RX ORDER — ESCITALOPRAM OXALATE 10 MG/1
10 TABLET ORAL DAILY
Qty: 90 TABLET | Refills: 0 | Status: SHIPPED | OUTPATIENT
Start: 2020-10-13 | End: 2020-12-02

## 2020-10-13 NOTE — PATIENT INSTRUCTIONS
I have complete taryn in the body's ability to heal and transform. The products and items listed below (the “Products”)  and their claims have not been evaluated by the Food and Drug Administration.  Dietary products are not intended to treat, prevent, m follow your inhale and exhale. There are hundreds of different apps available to help you relax, including “Breathe to Relax” and “Insight Timer.”  · If you have specific Religion beliefs, prayer can be a form of meditation.  Repeating prayers or mantras c try pursing your lips slightly if this seems awkward. STEPS    1. Exhale completely through your mouth, making a whoosh sound. 2. Close your mouth and inhale quietly through your nose to a mental count of 4.    3. Hold your breath for a count of 7. consult a licensed health care professional before starting any supplement, dietary, or exercise program, especially if you are pregnant or have any pre-existing injuries or medical conditions.  The patient agrees that the [facility] and its
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
## 2020-12-02 ENCOUNTER — PATIENT MESSAGE (OUTPATIENT)
Dept: INTEGRATIVE MEDICINE | Facility: CLINIC | Age: 55
End: 2020-12-02

## 2020-12-02 DIAGNOSIS — I10 ESSENTIAL HYPERTENSION: ICD-10-CM

## 2020-12-02 DIAGNOSIS — F41.9 ANXIETY: ICD-10-CM

## 2020-12-02 RX ORDER — ESCITALOPRAM OXALATE 10 MG/1
10 TABLET ORAL DAILY
Qty: 90 TABLET | Refills: 0 | Status: SHIPPED | OUTPATIENT
Start: 2020-12-02 | End: 2020-12-03

## 2020-12-02 RX ORDER — AMLODIPINE BESYLATE 5 MG/1
5 TABLET ORAL DAILY
Qty: 90 TABLET | Refills: 0 | Status: SHIPPED | OUTPATIENT
Start: 2020-12-02 | End: 2020-12-03

## 2020-12-02 RX ORDER — ENALAPRIL MALEATE 20 MG/1
20 TABLET ORAL DAILY
Qty: 90 TABLET | Refills: 0 | Status: SHIPPED | OUTPATIENT
Start: 2020-12-02 | End: 2020-12-03

## 2020-12-02 RX ORDER — HYDROCHLOROTHIAZIDE 25 MG/1
25 TABLET ORAL DAILY
Qty: 90 TABLET | Refills: 0 | Status: SHIPPED | OUTPATIENT
Start: 2020-12-02 | End: 2020-12-03

## 2020-12-02 NOTE — TELEPHONE ENCOUNTER
A refill request was received for:  Requested Prescriptions     Pending Prescriptions Disp Refills   • Enalapril Maleate 20 MG Oral Tab 90 tablet 0     Sig: Take 1 tablet (20 mg total) by mouth daily.    • hydrochlorothiazide 25 MG Oral Tab 90 tablet 0
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
## 2020-12-02 NOTE — TELEPHONE ENCOUNTER
A refill request was received for:  Requested Prescriptions     Pending Prescriptions Disp Refills   • escitalopram (LEXAPRO) 10 MG Oral Tab 90 tablet 0     Sig: Take 1 tablet (10 mg total) by mouth daily.      Last refill date: 10.13.20  Qty: 90 tabs  Last

## 2020-12-03 DIAGNOSIS — F41.9 ANXIETY: ICD-10-CM

## 2020-12-03 DIAGNOSIS — I10 ESSENTIAL HYPERTENSION: ICD-10-CM

## 2020-12-03 RX ORDER — AMLODIPINE BESYLATE 5 MG/1
5 TABLET ORAL DAILY
Qty: 90 TABLET | Refills: 0 | Status: SHIPPED | OUTPATIENT
Start: 2020-12-03

## 2020-12-03 RX ORDER — ESCITALOPRAM OXALATE 10 MG/1
10 TABLET ORAL DAILY
Qty: 90 TABLET | Refills: 0 | Status: SHIPPED | OUTPATIENT
Start: 2020-12-03

## 2020-12-03 RX ORDER — ENALAPRIL MALEATE 20 MG/1
20 TABLET ORAL DAILY
Qty: 90 TABLET | Refills: 0 | Status: SHIPPED | OUTPATIENT
Start: 2020-12-03

## 2020-12-03 RX ORDER — HYDROCHLOROTHIAZIDE 25 MG/1
25 TABLET ORAL DAILY
Qty: 90 TABLET | Refills: 0 | Status: SHIPPED | OUTPATIENT
Start: 2020-12-03

## 2020-12-05 ENCOUNTER — APPOINTMENT (OUTPATIENT)
Dept: GENERAL RADIOLOGY | Facility: HOSPITAL | Age: 55
End: 2020-12-05
Attending: EMERGENCY MEDICINE
Payer: MEDICAID

## 2020-12-05 ENCOUNTER — HOSPITAL ENCOUNTER (EMERGENCY)
Facility: HOSPITAL | Age: 55
Discharge: HOME | End: 2020-12-05
Attending: EMERGENCY MEDICINE
Payer: MEDICAID

## 2020-12-05 VITALS
TEMPERATURE: 97 F | RESPIRATION RATE: 16 BRPM | DIASTOLIC BLOOD PRESSURE: 92 MMHG | SYSTOLIC BLOOD PRESSURE: 153 MMHG | HEIGHT: 65 IN | HEART RATE: 62 BPM | BODY MASS INDEX: 31.99 KG/M2 | WEIGHT: 192 LBS | OXYGEN SATURATION: 100 %

## 2020-12-05 DIAGNOSIS — S16.1XXA STRAIN OF NECK MUSCLE, INITIAL ENCOUNTER: Primary | ICD-10-CM

## 2020-12-05 PROCEDURE — 99284 EMERGENCY DEPT VISIT MOD MDM: CPT

## 2020-12-05 PROCEDURE — 96372 THER/PROPH/DIAG INJ SC/IM: CPT

## 2020-12-05 PROCEDURE — 72052 X-RAY EXAM NECK SPINE 6/>VWS: CPT | Performed by: EMERGENCY MEDICINE

## 2020-12-05 PROCEDURE — 99283 EMERGENCY DEPT VISIT LOW MDM: CPT

## 2020-12-05 RX ORDER — ORPHENADRINE CITRATE 100 MG/1
100 TABLET, EXTENDED RELEASE ORAL 2 TIMES DAILY PRN
Qty: 10 TABLET | Refills: 0 | Status: SHIPPED | OUTPATIENT
Start: 2020-12-05

## 2020-12-05 RX ORDER — KETOROLAC TROMETHAMINE 30 MG/ML
60 INJECTION, SOLUTION INTRAMUSCULAR; INTRAVENOUS ONCE
Status: COMPLETED | OUTPATIENT
Start: 2020-12-05 | End: 2020-12-05

## 2020-12-05 RX ORDER — HYDROCODONE BITARTRATE AND ACETAMINOPHEN 5; 325 MG/1; MG/1
1-2 TABLET ORAL EVERY 6 HOURS PRN
Qty: 10 TABLET | Refills: 0 | Status: SHIPPED | OUTPATIENT
Start: 2020-12-05 | End: 2020-12-12

## 2020-12-05 RX ORDER — NAPROXEN 500 MG/1
500 TABLET ORAL 2 TIMES DAILY PRN
Qty: 20 TABLET | Refills: 0 | Status: SHIPPED | OUTPATIENT
Start: 2020-12-05
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
## 2020-12-07 NOTE — CM/SW NOTE
Spoke to pharmacist at [facility] and she informed me that orphenadrine is on backorder and they would need a new order for an alternative.  I informed her that Dr Marlene Nair is not here today and another physician would not prescribe this since they didn't see this

## 2021-01-01 NOTE — TELEPHONE ENCOUNTER
Hema Mason,    Yes, I recommend that she reduce the dose to 1/2 tab daily and see if this improves. Medication could possibly cause a reaction like this however I would typically expect it to start as soon as the medication is started.   If no improvement wit Problem:  CARE  Goal: Vital signs are medically acceptable  Outcome: Ongoing  Note: See flowsheet     Problem:  CARE  Goal: Thermoregulation maintained greater than 97/less than 99.4 Ax  Outcome: Ongoing  Note: See flowsheet     Problem:  CARE  Goal: Infant exhibits minimal/reduced signs of pain/discomfort  Outcome: Ongoing  Note: No sign of pain this shift     Problem:  CARE  Goal: Infant is maintained in safe environment  Outcome: Ongoing  Note: Infant remains in SCN     Problem:  CARE  Goal: Baby is with Mother and family  Outcome: Ongoing  Note: Parents visit as able     Problem: Discharge Planning:  Goal: Discharged to appropriate level of care  Description: Discharged to appropriate level of care  Outcome: Ongoing  Note: Infant is not ready for discharge, will monitor for needs     Problem: Fluid Volume - Imbalance:  Goal: Absence of imbalanced fluid volume signs and symptoms  Description: Absence of imbalanced fluid volume signs and symptoms  Outcome: Ongoing  Note: Infant remains on IV fluids     Problem: Gas Exchange - Impaired:  Goal: Levels of oxygenation will improve  Description: Levels of oxygenation will improve  Outcome: Ongoing  Note: Infant has the occasional desat with bradycardia. Problem: Serum Glucose Level - Abnormal:  Goal: Ability to maintain appropriate glucose levels will improve to within specified parameters  Description: Ability to maintain appropriate glucose levels will improve to within specified parameters  Outcome: Ongoing  Note: No sign of glucose instability this shift     Problem: Skin Integrity - Impaired:  Goal: Skin appearance normal  Description: Skin appearance normal  Outcome: Ongoing  Note: No sign of skin breakdown this shift.      Problem: Growth and Development:  Goal: Demonstration of normal  growth will improve to within specified parameters  Description: Demonstration of normal  growth will improve to within specified parameters  Outcome: Ongoing  Note: Infant will be weighed daily     Problem: Tissue Perfusion, Altered:  Goal: Hemodynamic stability will improve  Description: Hemodynamic stability will improve  Outcome: Ongoing  Note: No sign of hemodynamic instability this shift     Problem: Nutritional:  Goal: Knowledge of adequate nutritional intake and output  Description: Knowledge of adequate nutritional intake and output  Outcome: Ongoing  Note: Mother is aware of infants needs. Plan of care reviewed with mother and/or legal guardian. Questions & concerns addressed with verbalized understanding from mother and/or legal guardian. Mother and/or legal guardian participated in goal setting for their baby.

## 2021-04-14 ENCOUNTER — PATIENT OUTREACH (OUTPATIENT)
Dept: INTEGRATIVE MEDICINE | Facility: CLINIC | Age: 56
End: 2021-04-14

## 2021-05-29 ENCOUNTER — APPOINTMENT (OUTPATIENT)
Dept: GENERAL RADIOLOGY | Facility: HOSPITAL | Age: 56
End: 2021-05-29
Attending: EMERGENCY MEDICINE
Payer: MEDICAID

## 2021-05-29 PROCEDURE — 71045 X-RAY EXAM CHEST 1 VIEW: CPT | Performed by: EMERGENCY MEDICINE
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
## 2021-05-29 NOTE — ED INITIAL ASSESSMENT (HPI)
Patient arrives per EMS for evaluation of erratic behavior. Discharged  from [facility] ER this AM with dx.  Of Delirium and substance abuse

## 2021-05-30 NOTE — ED QUICK NOTES
Pt states she is feeling better. Pt states she smoked marijuana today and was waiting for her son to pick her up to take her to the '[place]. \" Pt states she was going to see the \"2 broke girls. \" Await psych eval.

## 2021-05-30 NOTE — ED PROVIDER NOTES
Patient here with psychosis polysubstance abuse, resting comfortably without complaint, awaiting placement,

## 2021-05-30 NOTE — ED QUICK NOTES
PTs son at bedside, escalating patient.  explaining what pt has signed and what to expect with inpatient admission. Pts son agreeable to help stabilize pt. RN will continue to monitor.
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
## 2021-05-30 NOTE — ED NOTES
Pt accepted to [facility]. Accepting doc is Dr. Laura Wharton. Site does not have transport time yet - will call back asap to let us know when we can arrange it.

## 2021-05-30 NOTE — ED QUICK NOTES
Per Original RN, patient arrived to ED with no personal belongings besides socks and 2 pairs of glasses.

## 2021-05-30 NOTE — ED QUICK NOTES
Pt awake, claiming she did not masturbate in front police as police lie. Pt oriented to the ED and the expectations of the ED. Pt agreeable to plan crackers provided, Pamela provided. . RN will continue to monitor.

## 2021-05-30 NOTE — ED QUICK NOTES
Pt ripped out IV, IV laying on bed beside patient. No bleeding from site at this time. COVID swabs collected.  Pt calm and cooperative

## 2023-04-20 ENCOUNTER — TELEPHONE (OUTPATIENT)
Facility: CLINIC | Age: 58
End: 2023-04-20

## 2023-04-20 NOTE — TELEPHONE ENCOUNTER
----- Message from Priti Dela Cruz, [address] [place] sent at 6/22/2018 12:49 PM CDT -----  Regarding: Recall colon   Recall colon in 5 years per MG.  Colon done 6-20-18

## 2023-09-25 ENCOUNTER — HOSPITAL ENCOUNTER (EMERGENCY)
Facility: HOSPITAL | Age: 58
Discharge: HOME | End: 2023-09-25
Attending: EMERGENCY MEDICINE
Payer: COMMERCIAL

## 2023-09-25 VITALS
HEART RATE: 69 BPM | OXYGEN SATURATION: 100 % | SYSTOLIC BLOOD PRESSURE: 154 MMHG | RESPIRATION RATE: 20 BRPM | TEMPERATURE: 98 F | DIASTOLIC BLOOD PRESSURE: 92 MMHG

## 2023-09-25 DIAGNOSIS — R63.4 WEIGHT LOSS: Primary | ICD-10-CM

## 2023-09-25 LAB
ALBUMIN SERPL-MCNC: 3.2 G/DL (ref 3.4–5)
ALBUMIN/GLOB SERPL: 0.8 {RATIO} (ref 1–2)
ALP LIVER SERPL-CCNC: 72 U/L
ALT SERPL-CCNC: 72 U/L
ANION GAP SERPL CALC-SCNC: 4 MMOL/L (ref 0–18)
AST SERPL-CCNC: 35 U/L (ref 15–37)
BASOPHILS # BLD AUTO: 0.04 X10(3) UL (ref 0–0.2)
BASOPHILS NFR BLD AUTO: 0.5 %
BILIRUB SERPL-MCNC: 0.2 MG/DL (ref 0.1–2)
BUN BLD-MCNC: 10 MG/DL (ref 7–18)
BUN/CREAT SERPL: 11 (ref 10–20)
CALCIUM BLD-MCNC: 8.8 MG/DL (ref 8.5–10.1)
CHLORIDE SERPL-SCNC: 111 MMOL/L (ref 98–112)
CO2 SERPL-SCNC: 29 MMOL/L (ref 21–32)
CREAT BLD-MCNC: 0.91 MG/DL
DEPRECATED RDW RBC AUTO: 50.5 FL (ref 35.1–46.3)
EGFRCR SERPLBLD CKD-EPI 2021: 74 ML/MIN/1.73M2 (ref 60–?)
EOSINOPHIL # BLD AUTO: 0.02 X10(3) UL (ref 0–0.7)
EOSINOPHIL NFR BLD AUTO: 0.2 %
ERYTHROCYTE [DISTWIDTH] IN BLOOD BY AUTOMATED COUNT: 15.1 % (ref 11–15)
GLOBULIN PLAS-MCNC: 4 G/DL (ref 2.8–4.4)
GLUCOSE BLD-MCNC: 99 MG/DL (ref 70–99)
HCT VFR BLD AUTO: 39.9 %
HGB BLD-MCNC: 12.9 G/DL
IMM GRANULOCYTES # BLD AUTO: 0.02 X10(3) UL (ref 0–1)
IMM GRANULOCYTES NFR BLD: 0.2 %
LYMPHOCYTES # BLD AUTO: 3.65 X10(3) UL (ref 1–4)
LYMPHOCYTES NFR BLD AUTO: 42.1 %
MCH RBC QN AUTO: 29.1 PG (ref 26–34)
MCHC RBC AUTO-ENTMCNC: 32.3 G/DL (ref 31–37)
MCV RBC AUTO: 90.1 FL
MONOCYTES # BLD AUTO: 0.68 X10(3) UL (ref 0.1–1)
MONOCYTES NFR BLD AUTO: 7.8 %
NEUTROPHILS # BLD AUTO: 4.27 X10 (3) UL (ref 1.5–7.7)
NEUTROPHILS # BLD AUTO: 4.27 X10(3) UL (ref 1.5–7.7)
NEUTROPHILS NFR BLD AUTO: 49.2 %
OSMOLALITY SERPL CALC.SUM OF ELEC: 297 MOSM/KG (ref 275–295)
PLATELET # BLD AUTO: 276 10(3)UL (ref 150–450)
POTASSIUM SERPL-SCNC: 3.3 MMOL/L (ref 3.5–5.1)
PROT SERPL-MCNC: 7.2 G/DL (ref 6.4–8.2)
RBC # BLD AUTO: 4.43 X10(6)UL
SODIUM SERPL-SCNC: 144 MMOL/L (ref 136–145)
WBC # BLD AUTO: 8.7 X10(3) UL (ref 4–11)

## 2023-09-25 PROCEDURE — 99283 EMERGENCY DEPT VISIT LOW MDM: CPT

## 2023-09-25 PROCEDURE — 80053 COMPREHEN METABOLIC PANEL: CPT | Performed by: EMERGENCY MEDICINE

## 2023-09-25 PROCEDURE — 36415 COLL VENOUS BLD VENIPUNCTURE: CPT

## 2023-09-25 PROCEDURE — 85025 COMPLETE CBC W/AUTO DIFF WBC: CPT | Performed by: EMERGENCY MEDICINE

## 2023-09-25 NOTE — ED INITIAL ASSESSMENT (HPI)
Patient arrives to the ER for evaluation for blood work. Patient states she has lost some weight in the past 30 days. Patient denies any pain, cough, fever or abnormal sx. Patient states in the past she was never able to pass a tb test. Denies any resp sx.

## 2023-09-25 NOTE — ED QUICK NOTES
Patient admits to having [employer] access and is requesting to view results on there, rather than wait in the ER for them.

## 2023-09-26 NOTE — ED QUICK NOTES
Patient safe to DC home per MD. Haven Sarabia to dress self. DC teaching done, instructions reviewed with patient including when and how to follow up with healthcare providers and when to seek emergency care. The patient verbalizes understanding. Patient ambulatory with steady gait to exit.

## 2024-06-06 NOTE — ED PROVIDER NOTES
Patient awaiting transfer to [address] [place] [address]. Patient has been accepted Dr. Lonnie Serra. Resting comfortably. Repeat vital signs at 6 AM blood pressure 110/65. Heart rate 65. Respiratory rate 16. O2 sats of 99% with a temperature of 98.6.     Reviewed the laborato Yes

## (undated) NOTE — MR AVS SNAPSHOT
[place]  [address]  [phone]               Thank you for choosing us for your health care visit with Yossi Gillis MD.  We are glad to serve you and happy to provide you with this summa conjunctiva breaks open and bleeds. The blood then collects under the conjunctiva and turns part of the eye red. Over several weeks, your body then absorbs the blood. What causes subconjunctival hemorrhage? In many cases the cause isn’t known.  But some h In most cases you will not need treatment. The red patch will usually go away on its own in a few weeks. It will turn from red to brown and then yellow. There are no treatments to speed up this process.  Your doctor may suggest you use a warm compress and a Date Last Reviewed: 6/14/2015  © 6621-4859 [address], [address] [place], [place] [address]. All rights reserved. This information is not intended as a substitute for professional medical care.  Always follow your healthcare professional

## (undated) NOTE — LETTER
Date: 10/11/2018    Patient Name: Cricket Hahn          To Whom it may concern: This letter has been written at the patient's request. The above patient was seen at the [facility] for treatment of a medical condition.     This patient benu

## (undated) NOTE — LETTER
To Whom It May Concern:    Tim Chase is currently under my medical care. Please excuse Zamzam for the rest of today, 3/20/20. She may return to work on 3/21/20. If you require additional information please contact our office.     Sincerely,

## (undated) NOTE — LETTER
4/20/2023    Zamzam Mejia        [address] [facility] [id number]            Dear Oliverio Brandon,      Our records indicate that you are due for an appointment for a Colonoscopy with Bull Alvarez MD. Our doctors are booking out about 3-5 months in advance for procedures. Please call our office to schedule a phone screening appointment to plan for the procedure(s). Your medical well-being is important to us. If your insurance requires a referral, please call your primary care office to request one.       Thank you,      The Physicians and Staff at [facility]

## (undated) NOTE — Clinical Note
Marry,I saw Zamzam today for weight loss/management. I have recommended intensive lifestyle/behavioral modifications for weight loss.  In addition, I have recommended she meet with dietician and start vyvanse for binging and topiramate for cravings (also h

## (undated) NOTE — ED AVS SNAPSHOT
Mounika Betancourt   MRN: [id number]    Department:  [facility] Emergency Department   Date of Visit:  4/26/2018           Disclosure     Insurance plans vary and the physician(s) referred by the ER may not be covered by your plan.  Please contact y CARE PHYSICIAN AT ONCE OR RETURN IMMEDIATELY TO THE EMERGENCY DEPARTMENT. If you have been prescribed any medication(s), please fill your prescription right away and begin taking the medication(s) as directed.   If you believe that any of the medications

## (undated) NOTE — LETTER
5/24/2018          To Whom It May Concern:    Alhaji Ortega is currently under my medical care. Please excuse Zamzam for 1 days (6/13/2018). She may return to work on 6/14/2018. Activity is restricted as follows: none.     If you require additional